# Patient Record
Sex: FEMALE | Race: WHITE | Employment: PART TIME | URBAN - METROPOLITAN AREA
[De-identification: names, ages, dates, MRNs, and addresses within clinical notes are randomized per-mention and may not be internally consistent; named-entity substitution may affect disease eponyms.]

---
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
## 2019-05-28 RX ORDER — NORGESTIMATE AND ETHINYL ESTRADIOL 7DAYSX3 28
1 KIT ORAL DAILY
Qty: 3 PACKAGE | Refills: 4 | Status: CANCELLED | OUTPATIENT
Start: 2019-05-28

## 2019-05-29 ENCOUNTER — OFFICE VISIT (OUTPATIENT)
Dept: OBGYN CLINIC | Age: 19
End: 2019-05-29

## 2019-05-29 VITALS
WEIGHT: 177 LBS | SYSTOLIC BLOOD PRESSURE: 126 MMHG | HEIGHT: 67 IN | DIASTOLIC BLOOD PRESSURE: 64 MMHG | BODY MASS INDEX: 27.78 KG/M2

## 2019-05-29 DIAGNOSIS — Z01.419 ENCOUNTER FOR GYNECOLOGICAL EXAMINATION (GENERAL) (ROUTINE) WITHOUT ABNORMAL FINDINGS: Primary | ICD-10-CM

## 2019-05-29 DIAGNOSIS — Z11.3 SCREEN FOR STD (SEXUALLY TRANSMITTED DISEASE): ICD-10-CM

## 2019-05-29 NOTE — PROGRESS NOTES
[facility]  [email or web address]  [phone]    Angus Purvis MD, FACOG       Annual Gynecologic Exam:  WWE <40  Chief Complaint   Patient presents with    Well Woman         Minus Randell is a 22 y.o.  [place]ITE [place]  female who presents for an annual well woman exam.  Patient's last menstrual period was 05/15/2019 (exact date). .    With regard to the Gardisil vaccine, she received 3 of the 3 injections. She does report additional concerns today. She is no longer taking OCP and wants to try a new form of BC concerned about potential interactions with other meds. Menstrual status:  Her periods are moderate. She does not report dysmenorrhea/painful menses. She does not report irregular bleeding. Sexual history and Contraception:  Social History     Substance and Sexual Activity   Sexual Activity Never     She always use condoms with sexual activity  She does reports new sexual partner(s) in the last year. The patient does not request STD testing. We recommended testing per CDC guidelines and at patient request.     Preventive Medicine History:  Her most recent Pap smear result: Never    Past Medical History:   Diagnosis Date    Acne     Mood disorder ([facility] [address]     bipolar     OB History    Para Term  AB Living   0 0 0 0 0 0   SAB TAB Ectopic Molar Multiple Live Births   0 0 0   0       History reviewed. No pertinent surgical history.   Family History   Problem Relation Age of Onset    Hypertension Maternal Grandfather     Cancer Maternal Grandfather         kidney cancer    Breast Cancer Maternal Grandmother     No Known Problems Mother     No Known Problems Father      Social History     Socioeconomic History    Marital status: SINGLE     Spouse name: Not on file    Number of children: Not on file    Years of education: Not on file    Highest education level: Not on file   Occupational History    Not on file   Social Needs    Financial resource strain: Not on file    Food insecurity:     Worry: Not on file     Inability: Not on file    Transportation needs:     Medical: Not on file     Non-medical: Not on file   Tobacco Use    Smoking status: Never Smoker    Smokeless tobacco: Never Used   Substance and Sexual Activity    Alcohol use: Not on file    Drug use: Not on file    Sexual activity: Never   Lifestyle    Physical activity:     Days per week: Not on file     Minutes per session: Not on file    Stress: Not on file   Relationships    Social connections:     Talks on phone: Not on file     Gets together: Not on file     Attends Nondenominational service: Not on file     Active member of club or organization: Not on file     Attends meetings of clubs or organizations: Not on file     Relationship status: Not on file    Intimate partner violence:     Fear of current or ex partner: Not on file     Emotionally abused: Not on file     Physically abused: Not on file     Forced sexual activity: Not on file   Other Topics Concern    Not on file   Social History Narrative    Not on file       No Known Allergies    Current Outpatient Medications   Medication Sig    TRI-PREVIFEM, 28, 0.18/0.215/0.25 mg-35 mcg (28) tab TAKE 1 TABLET BY MOUTH EVERY DAY    SERTRALINE HCL (ZOLOFT PO) Take  by mouth.  OXCARBAZEPINE (TRILEPTAL PO) Take  by mouth. No current facility-administered medications for this visit. There is no problem list on file for this patient.       Review of Systems - History obtained from the patient  Constitutional: negative for weight loss, fever, night sweats  HEENT: negative for hearing loss, earache, congestion, snoring, sorethroat  CV: negative for chest pain, palpitations, edema  Resp: negative for cough, shortness of breath, wheezing  GI: negative for change in bowel habits, abdominal pain, black or bloody stools  : negative for frequency, dysuria, hematuria  GYN: see HPI  MSK: negative for back pain, joint pain, muscle pain  Breast: negative for breast lumps, nipple discharge, galactorrhea  Skin :negative for itching, rash, hives  Neuro: negative for dizziness, headache, confusion, weakness  Psych: negative for anxiety, depression, change in mood  Heme/lymph: negative for bleeding, bruising, pallor    Physical Exam  Visit Vitals  /64   Ht 5' 7\" (1.702 m)   Wt 177 lb (80.3 kg)   LMP 05/15/2019 (Exact Date)   BMI 27.72 kg/m²       Constitutional  · Appearance: well-nourished, well developed, alert, in no acute distress    HENT  · Head and Face: appears normal    Neck  · Inspection/Palpation: normal appearance, no masses or tenderness  · Lymph Nodes: no lymphadenopathy present  · Thyroid: gland size normal, nontender, no nodules or masses present on palpation    Chest  · Respiratory Effort: breathing unlabored  · Auscultation: normal breath sounds    Cardiovascular  · Heart:  · Auscultation: regular rate and rhythm without murmur    Breasts  · Inspection of Breasts: breasts symmetrical, no skin changes, no discharge present, nipple appearance normal, no skin retraction present  · Palpation of Breasts and Axillae: no masses present on palpation, no breast tenderness  · Axillary Lymph Nodes: no lymphadenopathy present    Gastrointestinal  · Abdominal Examination: abdomen non-tender to palpation, normal bowel sounds, no masses present  · Liver and spleen: no hepatomegaly present, spleen not palpable  · Hernias: no hernias identified    Genitourinary  · External Genitalia: normal appearance for age, no discharge present, no tenderness present, no inflammatory lesions present, no masses present  · Vagina: normal vaginal vault without central or paravaginal defects, no discharge present, no inflammatory lesions present, no masses present  · Bladder: non-tender to palpation  · Urethra: appears normal  · Cervix: normal   · Uterus: normal size, shape and consistency  · Adnexa: no adnexal tenderness present, no adnexal masses present  · Perineum: perineum within normal limits, no evidence of trauma, no rashes or skin lesions present  · Anus: anus within normal limits, no hemorrhoids present  · Inguinal Lymph Nodes: no lymphadenopathy present    Skin  · General Inspection: no rash, no lesions identified    Neurologic/Psychiatric  · Mental Status:  · Orientation: grossly oriented to person, place and time  · Mood and Affect: mood normal, affect appropriate    Assessment:  22 y.o.  for well woman exam  Encounter Diagnoses   Name Primary?  Screen for STD (sexually transmitted disease)     Encounter for gynecological examination (general) (routine) without abnormal findings Yes       Plan:  The patient was counseled about diet, exercise, healthy lifestyle  We discussed self breast exam  We discussed safer sex practices, condom use and risk factors for sexually transmitted diseases. We discussed current pap smear and HR HPV testing guidelines. We recommend follow up one year for routine annual gynecologic exam or sooner prn  We recommend routine follow up with her primary care doctor for management of chronic medical problems and non-gynecologic concerns  Handouts were given to the patient  We discussed calcium/vitamin D/weight bearing exercise and osteoporosis prevention  We discussed progesterone only and non hormonal options for contraception including but not limited to condoms, IUDs, Nexplanon, and depo provera. Discussed risks, benefits and alternatives of OCP/nuvaring/patch: including but not limited to dvt/pe/mi/cva/ca/gi risks and that smoking, increasing age and other health conditions can increase these risks. nexplanon h/o  RTC for IUD or LARC placement on menses: after patient confirms coverage with insurance and has no unprotected intercourse for two weeks. Patient advised to premedicate with 600 mg ibuprofen if she has no contraindications.  Her AE and pap should also be up to date, if indicated. Folllow up:  [x] return for annual well woman exam in one year or sooner if she is having problems  [] follow up and ultrasound  [] 6 months  [] 3 months  [] 6 weeks   [] 1 month    Orders Placed This Encounter    CHLAMYDIA/GC PCR       No results found for any visits on 05/29/19.

## 2019-05-29 NOTE — PATIENT INSTRUCTIONS

## 2019-05-30 LAB
C TRACH RRNA SPEC QL NAA+PROBE: NEGATIVE
N GONORRHOEA RRNA SPEC QL NAA+PROBE: NEGATIVE

## 2019-06-04 ENCOUNTER — OFFICE VISIT (OUTPATIENT)
Dept: OBGYN CLINIC | Age: 19
End: 2019-06-04

## 2019-06-04 VITALS
WEIGHT: 175 LBS | SYSTOLIC BLOOD PRESSURE: 124 MMHG | DIASTOLIC BLOOD PRESSURE: 72 MMHG | HEIGHT: 67 IN | BODY MASS INDEX: 27.47 KG/M2

## 2019-06-04 DIAGNOSIS — Z76.89 ENCOUNTER FOR MENSTRUAL REGULATION: Primary | ICD-10-CM

## 2019-06-04 LAB
HCG URINE, QL. (POC): NEGATIVE
VALID INTERNAL CONTROL?: YES

## 2019-06-04 NOTE — PATIENT INSTRUCTIONS
Implant for Birth Control: Care Instructions  Your Care Instructions    The implant is used to prevent pregnancy. It's a thin goldie about the size of a matchstick that is inserted under the skin (subdermal) on the inside of your arm. The implant prevents pregnancy for 3 years. After it is put in, you don't have to do anything else to prevent pregnancy. Follow-up care is a key part of your treatment and safety. Be sure to make and go to all appointments, and call your doctor if you are having problems. It's also a good idea to know your test results and keep a list of the medicines you take. How can you care for yourself at home? How do you use the subdermal implant? · The implant is put in and taken out by your doctor or another trained health professional. This is done in your doctor's office. It only takes a few minutes. · Ask your doctor if you need to use backup birth control, such as a condom. And ask if (and how long) you should avoid intercourse after you get the implant. You may need to do this, depending on where you are in your cycle. What else do you need to know? · The implant has side effects. ? You may have changes in your period. Your period may stop. You may also have spotting or bleeding between periods. ? You may have mood changes, less interest in sex, or weight gain. · Remember that 3 years after you receive the implant, you must have it removed or get a new one.  ? If you don't replace the implant and don't use another form of birth control, you could get pregnant. ? If you have the implant removed, you'll have to find another method of birth control. If you don't, you may get pregnant. ? Even if you are planning to get pregnant, you have to have the implant removed. · Check with your doctor before you use any other medicines. This includes over-the-counter medicines, vitamins, herbal products, and supplements.  Birth control hormones may not work as well to prevent pregnancy when combined with other medicines. · The implant doesn't protect against sexually transmitted infections (STIs), such as herpes or HIV/AIDS. If you're not sure if your sex partner might have an STI, use a condom to protect against infection. When should you call for help? Watch closely for changes in your health, and be sure to contact your doctor if you have any problems. Where can you learn more? Go to http://matilde-qamar.info/. Enter X457 in the search box to learn more about \"Implant for Birth Control: Care Instructions. \"  Current as of: September 5, 2018  Content Version: 11.9  © 5219-7397 [employer]. Care instructions adapted under license by [employer] (which disclaims liability or warranty for this information). If you have questions about a medical condition or this instruction, always ask your healthcare professional. [address] any warranty or liability for your use of this information.

## 2019-06-04 NOTE — PROGRESS NOTES
[facility]  OFFICE PROCEDURE PROGRESS NOTE        Chart reviewed for the following:   Roseann HUTCHISON, have reviewed the History, Physical and updated the Allergic reactions for [place] performed immediately prior to start of procedure:   Roseann HUTCHISON, have performed the following reviews on [address] [place] [address] prior to the start of the procedure:            * Patient was identified by name and date of birth   * Agreement on procedure being performed was verified  * Risks and Benefits explained to the patient  * Procedure site verified and marked as necessary  * Consent was signed and verified     Time: 1:20 pm    Date of procedure: 6/4/2019    Procedure performed by: Shandra Springer MD    Provider assisted by: Roseann Watts LPN    Patient assisted by: self    How tolerated by patient: tolerated the procedure well with no complications    Post Procedural Pain Scale: 0 - No Hurt  Comments: none            Procedure note: Nexplanon insertion    [address] [place] [address] is a [de-identified] ,  22 y.o. female [facility] whose Patient's last menstrual period was 05/31/2019. was on 5/31/2019 presents for office insertion of an [address] [place] [address] [address] sub-dermal contraceptive implant. She has had an opportunity to read the [address] [place] [address] [address] \"Patient Labeling and Consent Form\". We counseled the patient about the insertion and removal procedures as well as potential side-effects, benefits and risks. She state she had no further questions and signed the consent form. She confirmed that she has no allergies to Betadine or Xylocaine. She reclined on the examination table in the supine position with her left arm flexed at the elbow and externally rotated. The insertion site was identified and marked approximately 6-8 cm proximal to the elbow crease at the inner side of the upper arm in the standard fashion. A second marsha was placed 6-8 cm above the first marsha.  The insertion site was cleansed with Betadine antiseptic. Approximately 3 ml of 1% plain xylocaine were injected just under the skin along the planned insertion canal. The NEXPLANON sterile applicator was carefully removed from its blister pack and kept sterile. I removed the needle cap. I visually verified the presence of NEXPLANON inside the needle tip. The skin at the insertion site was then stretched by my thumb and index finger. I then inserted the needle tip through the skin at the appropriate angle to the skin surface, just until the skin has been penetrated. The needle was gently inserted to its full length. The slider was then pushed all the way and then released. I then removed the needle and palpated the implant in the appropriate location. The patient also palpated the implant in place. Both the patient and I were able to confirm the presence of the Miriam Belts in its subdermal location by palpation. A small adhesive bandage over the insertion site then wrapped a pressure bandage with sterile gauze. The patient User Card and Patient Chart Label were filled in. She was given the User Card for her records after explaining it to her in detail. I stressed to her that she must have the Miriam Belts removed before three years from today's date. She was then given the Personal Calendar (Bleeding Diary) with instructions in it's use. The patient received Nexplanon lot number [id number]. The Nexplanon did not come from a speciality pharmacy. We discussed typical bleeding patterns and side effects with the Nexplanon  We recommend bleeding/symptom calendar and follow three months to evaluate. We recommended back up contraception x 2 weeks after insertion.

## 2019-08-02 ENCOUNTER — TELEPHONE (OUTPATIENT)
Dept: OBGYN CLINIC | Age: 19
End: 2019-08-02

## 2019-08-02 NOTE — TELEPHONE ENCOUNTER
Patient's mother Jessica Mak calling (hippa & pw verified) stating that the patient has been battling a yeast infection x 1.5 weeks and tried OTC monistat with no relief. She has vaginal discharge with itching and irritaiton. Spoke with TP - can be seen today if at the office by 2:45pm or can be seen at 8:50am on Monday. 2:36pm - L/M for patient's mother and advised of above. Requested mother to call back with which appt she would like.

## 2019-08-06 ENCOUNTER — OFFICE VISIT (OUTPATIENT)
Dept: OBGYN CLINIC | Age: 19
End: 2019-08-06

## 2019-08-06 VITALS
DIASTOLIC BLOOD PRESSURE: 60 MMHG | WEIGHT: 170 LBS | BODY MASS INDEX: 26.68 KG/M2 | HEIGHT: 67 IN | SYSTOLIC BLOOD PRESSURE: 126 MMHG

## 2019-08-06 DIAGNOSIS — N89.8 VAGINAL DISCHARGE: ICD-10-CM

## 2019-08-06 DIAGNOSIS — N89.8 VAGINAL ODOR: Primary | ICD-10-CM

## 2019-08-06 LAB — WET MOUNT POCT, WMPOCT: NORMAL

## 2019-08-06 RX ORDER — NYSTATIN AND TRIAMCINOLONE ACETONIDE 100000; 1 [USP'U]/G; MG/G
OINTMENT TOPICAL 2 TIMES DAILY
Qty: 30 G | Refills: 0 | Status: SHIPPED | OUTPATIENT
Start: 2019-08-06 | End: 2019-08-13

## 2019-08-06 RX ORDER — FLUCONAZOLE 150 MG/1
150 TABLET ORAL DAILY
Qty: 1 TAB | Refills: 0 | Status: SHIPPED | OUTPATIENT
Start: 2019-08-06 | End: 2021-03-03 | Stop reason: ALTCHOICE

## 2019-08-06 NOTE — PROGRESS NOTES
[facility]  [email or web address]  [phone]    Isidoro Rankin MD, FACOG       OB/GYN Problem visit    Chief Complaint:   Chief Complaint   Patient presents with    Vaginitis     recurrent yeast       History of Present Illness: This is a new problem being evaluated by this provider. The patient is a 22 y.o. [de-identified]  female who reports having external itching for 2 weeks. She reports the symptoms are is unchanged. Aggravating factors include wearing a costume at work (quit now). Alleviating factors include none. Tried OTC yeast med 1 wk ago      She does not have other concerns. LMP: Patient's last menstrual period was 07/15/2019. PFSH:  Past Medical History:   Diagnosis Date    Acne     Mood disorder ([address]     bipolar    Nexplanon insertion 06/04/2019     History reviewed. No pertinent surgical history. Family History   Problem Relation Age of Onset    Hypertension Maternal Grandfather     Cancer Maternal Grandfather         kidney cancer    Breast Cancer Maternal Grandmother     No Known Problems Mother     No Known Problems Father      Social History     Tobacco Use    Smoking status: Never Smoker    Smokeless tobacco: Never Used   Substance Use Topics    Alcohol use: Not on file    Drug use: Not on file     No Known Allergies  Current Outpatient Medications   Medication Sig    nystatin-triamcinolone (MYCOLOG) 100,000-0.1 unit/gram-% ointment Apply  to affected area two (2) times a day for 7 days.  fluconazole (DIFLUCAN) 150 mg tablet Take 1 Tab by mouth daily.  SERTRALINE HCL (ZOLOFT PO) Take  by mouth.  OXCARBAZEPINE (TRILEPTAL PO) Take  by mouth.  TRI-PREVIFEM, 28, 0.18/0.215/0.25 mg-35 mcg (28) tab TAKE 1 TABLET BY MOUTH EVERY DAY     No current facility-administered medications for this visit.         Review of Systems:  History obtained from the patient  Constitutional: negative for fevers, chills and weight loss  ENT ROS: negative for - hearing change, oral lesions or visual changes  Respiratory: negative for cough, wheezing or dyspnea on exertion  Cardiovascular: negative for chest pain, irregular heart beats, exertional chest pressure/discomfort  Gastrointestinal: negative for dysphagia, nausea and vomiting  Genito-Urinary ROS:  see HPI  Inteument/breast: negative for rash, breast lump and nipple discharge  Musculoskeletal:negative for stiff joints, neck pain and muscle weakness  Endocrine ROS: negative for - breast changes, galactorrhea or temperature intolerance  Hematological and Lymphatic ROS: negative for - blood clots, bruising or swollen lymph nodes    Physical Exam:  Visit Vitals  /60   Ht 5' 7\" (1.702 m)   Wt 170 lb (77.1 kg)   BMI 26.63 kg/m²       GENERAL: alert, well appearing, and in no distress  HEAD: normocephalic, atraumatic. PULM: clear to auscultation, no wheezes, rales or rhonchi, symmetric air entry   COR: normal rate and regular rhythm, S1 and S2 normal   ABDOMEN: soft, nontender, nondistended, no masses or organomegaly   EGBUS: no lesions, mild labial minora inflammation, no masses  VULVA: normal appearing vulva with no masses, tenderness or lesions  VAGINA: normal appearing vagina with normal color, no lesions, white and thin discharge  CERVIX: normal appearing cervix without discharge or lesions, non tender  UTERUS: uterus is normal size, shape, consistency and nontender   ADNEXA: normal adnexa in size, nontender and no masses  NEURO: alert, oriented, normal speech    Assessment:  Encounter Diagnoses   Name Primary?  Vaginal odor Yes    Vaginal discharge        Plan:  The patient is advised that she should contact the office if she does not note improvement or if symptoms recur  Recommend follow up with PCP for non-gynecologic complaints and chronic medical problems. She should contact our office with any questions or concerns  She could keep her routine annual exam appointment.    We discussed potential causes of vaginal discharge/irritation. We discussed good vulvar hygiene. Recommended avoid vaginal irritants. Discussed use of mild soaps/detergents. Follow up if NI. We reviewed wet prep findings with the patient at her visit  Patient will be notified about swab results and prescription sent, if indicated.    Diflucan/mycolog II      Orders Placed This Encounter    NUSWAB VAGINITIS PLUS    AMB POC WET PREP (AKA STAIN, INTERPRET, WET MOUNT)    nystatin-triamcinolone (MYCOLOG) 100,000-0.1 unit/gram-% ointment    fluconazole (DIFLUCAN) 150 mg tablet       Results for orders placed or performed in visit on 08/06/19   AMB POC SMEAR, STAIN & INTERPRET, WET MOUNT   Result Value Ref Range    Wet mount (POC)      Narrative    MARIA ESTHER    Hypae: negative  Buds: negative    Wet Prep:  Trich: negative  Clue cells: negative  Hyphae: negative  Buds: negative  WBC's: normal

## 2019-08-06 NOTE — PATIENT INSTRUCTIONS
Vaginitis: Care Instructions  Your Care Instructions    Vaginitis is soreness or infection of the vagina. This common problem can cause itching and burning. And it can cause a change in vaginal discharge. Sometimes it can cause pain during sex. Vaginitis may be caused by bacteria, yeast, or other germs. Some infections that cause it are caught from a sexual partner. Bath products, spermicides, and douches can irritate the vagina too. Some women have this problem during and after menopause. A drop in estrogen levels during this time can cause dryness, soreness, and pain during sex. Your doctor can give you medicine to treat an infection. And home care may help you feel better. For certain types of infections, your sex partner must be treated too. Follow-up care is a key part of your treatment and safety. Be sure to make and go to all appointments, and call your doctor if you are having problems. It's also a good idea to know your test results and keep a list of the medicines you take. How can you care for yourself at home? · If your doctor prescribed antibiotics, take them as directed. Do not stop taking them just because you feel better. You need to take the full course of antibiotics. · Take your medicines exactly as prescribed. Call your doctor if you think you are having a problem with your medicine. · Do not eat or drink anything that has alcohol if you are taking metronidazole (Flagyl). · If you have a yeast infection, use over-the-counter products as your doctor tells you to. Or take medicine your doctor prescribes exactly as directed. · Wash your vaginal area daily with water. You also can use a mild, unscented soap if you want. · Do not use scented bath products. And do not use vaginal sprays or douches. · Put a washcloth soaked in cool water on the area to relieve itching. Or you can take cool baths.   · If you have dryness because of menopause, use estrogen cream or pills that your doctor prescribes. · Ask your doctor about when it is okay to have sex. · Use a personal lubricant before sex if you have dryness. Examples are Astroglide, K-Y Jelly, and Wet Lubricant Gel. · Ask your doctor if your sex partner also needs treatment. When should you call for help? Call your doctor now or seek immediate medical care if:    · You have a fever and pelvic pain.    Watch closely for changes in your health, and be sure to contact your doctor if:    · You have bleeding other than your period.     · You do not get better as expected. Where can you learn more? Go to http://matilde-qamar.info/. Enter H352 in the search box to learn more about \"Vaginitis: Care Instructions. \"  Current as of: February 19, 2019  Content Version: 12.1  © 4807-3226 [employer]. Care instructions adapted under license by [employer] (which disclaims liability or warranty for this information). If you have questions about a medical condition or this instruction, always ask your healthcare professional. [address] any warranty or liability for your use of this information.

## 2019-08-09 LAB
A VAGINAE DNA VAG QL NAA+PROBE: ABNORMAL SCORE
BVAB2 DNA VAG QL NAA+PROBE: ABNORMAL SCORE
C ALBICANS DNA VAG QL NAA+PROBE: NEGATIVE
C GLABRATA DNA VAG QL NAA+PROBE: NEGATIVE
C TRACH RRNA SPEC QL NAA+PROBE: NEGATIVE
MEGA1 DNA VAG QL NAA+PROBE: ABNORMAL SCORE
N GONORRHOEA RRNA SPEC QL NAA+PROBE: NEGATIVE
T VAGINALIS RRNA SPEC QL NAA+PROBE: NEGATIVE

## 2019-08-11 NOTE — PROGRESS NOTES
Abnormal, notify patient.   Consistent with BV (not yeast)  Rx:   flagyl 500mg bid   x 7 days    May cause nausea, take with food  Avoid etoh during treatment and 1 day following  Notify MD if NI after 1 week    (If patient prefers vaginal tx't  0.75 %t metronidazole vaginal gel   5 grams qhs per vagina  x5 days)

## 2019-08-19 ENCOUNTER — TELEPHONE (OUTPATIENT)
Dept: OBGYN CLINIC | Age: 19
End: 2019-08-19

## 2019-08-19 RX ORDER — METRONIDAZOLE 500 MG/1
500 TABLET ORAL 2 TIMES DAILY
Qty: 14 TAB | Refills: 0 | Status: SHIPPED | OUTPATIENT
Start: 2019-08-19 | End: 2019-08-26

## 2019-08-19 NOTE — TELEPHONE ENCOUNTER
Patient of TP. Calling to get results from 8-6-19:      Notes recorded by Jose Jansen MD on 8/11/2019 at 3:43 PM EDT  Abnormal, notify patient.   Consistent with BV (not yeast)  Rx:   flagyl 500mg bid   x 7 days    May cause nausea, take with food  Avoid etoh during treatment and 1 day following  Notify MD if NI after 1 week    (If patient prefers vaginal tx't  0.75 %t metronidazole vaginal gel   5 grams qhs per vagina  x5 days)      [facility]

## 2019-09-27 ENCOUNTER — TELEPHONE (OUTPATIENT)
Dept: OBGYN CLINIC | Age: 19
End: 2019-09-27

## 2019-09-27 RX ORDER — METRONIDAZOLE 7.5 MG/G
1 GEL VAGINAL
Qty: 45 G | Refills: 0 | Status: SHIPPED | OUTPATIENT
Start: 2019-09-27 | End: 2019-10-02

## 2019-09-27 NOTE — TELEPHONE ENCOUNTER
Patient of TP. She was recently diagnosed with BV on 8/6/19. She took the Flagyl 500 mg bid x 7 days and she never fully got better except she saw improvement x 2 days. She said now it is back to bothering her to the point she has to wear pads on and off. Itchy yellow discharge with foul fishy odor. She would like to try the Metrogel that you advised. She is off at college but still wants to try it.        [address] [place] [address], [address] [place] [address]

## 2021-02-03 ENCOUNTER — OFFICE VISIT (OUTPATIENT)
Dept: OBGYN CLINIC | Age: 21
End: 2021-02-03
Payer: COMMERCIAL

## 2021-02-03 VITALS
SYSTOLIC BLOOD PRESSURE: 116 MMHG | HEIGHT: 67 IN | WEIGHT: 159.6 LBS | HEART RATE: 94 BPM | BODY MASS INDEX: 25.05 KG/M2 | DIASTOLIC BLOOD PRESSURE: 80 MMHG

## 2021-02-03 DIAGNOSIS — N89.8 VAGINAL DISCHARGE: Primary | ICD-10-CM

## 2021-02-03 DIAGNOSIS — N63.20 LEFT BREAST LUMP: ICD-10-CM

## 2021-02-03 PROCEDURE — 99213 OFFICE O/P EST LOW 20 MIN: CPT | Performed by: OBSTETRICS & GYNECOLOGY

## 2021-02-03 RX ORDER — ETONOGESTREL 68 MG/1
IMPLANT SUBCUTANEOUS
COMMUNITY
End: 2021-04-21

## 2021-02-03 RX ORDER — LAMOTRIGINE 200 MG/1
TABLET ORAL
COMMUNITY
Start: 2021-01-04

## 2021-02-03 RX ORDER — TRAZODONE HYDROCHLORIDE 50 MG/1
TABLET ORAL
COMMUNITY
Start: 2020-11-19

## 2021-02-03 RX ORDER — SPIRONOLACTONE 100 MG/1
TABLET, FILM COATED ORAL
COMMUNITY
Start: 2020-12-24

## 2021-02-03 NOTE — PATIENT INSTRUCTIONS
Vaginitis: Care Instructions Your Care Instructions Vaginitis is soreness or infection of the vagina. This common problem can cause itching and burning. And it can cause a change in vaginal discharge. Sometimes it can cause pain during sex. Vaginitis may be caused by bacteria, yeast, or other germs. Some infections that cause it are caught from a sexual partner. Bath products, spermicides, and douches can irritate the vagina too. Some women have this problem during and after menopause. A drop in estrogen levels during this time can cause dryness, soreness, and pain during sex. Your doctor can give you medicine to treat an infection. And home care may help you feel better. For certain types of infections, your sex partner must be treated too. Follow-up care is a key part of your treatment and safety. Be sure to make and go to all appointments, and call your doctor if you are having problems. It's also a good idea to know your test results and keep a list of the medicines you take. How can you care for yourself at home? · If your doctor prescribed antibiotics, take them as directed. Do not stop taking them just because you feel better. You need to take the full course of antibiotics. · Take your medicines exactly as prescribed. Call your doctor if you think you are having a problem with your medicine. · Do not eat or drink anything that has alcohol if you are taking metronidazole (Flagyl). · If you have a yeast infection, use over-the-counter products as your doctor tells you to. Or take medicine your doctor prescribes exactly as directed. · Wash your vaginal area daily with water. You also can use a mild, unscented soap if you want. · Do not use scented bath products. And do not use vaginal sprays or douches. · Put a washcloth soaked in cool water on the area to relieve itching. Or you can take cool baths. · If you have dryness because of menopause, use estrogen cream or pills that your doctor prescribes. · Ask your doctor about when it is okay to have sex. · Use a personal lubricant before sex if you have dryness. Examples are Astroglide, K-Y Jelly, and Wet Lubricant Gel. · Ask your doctor if your sex partner also needs treatment. When should you call for help? Call your doctor now or seek immediate medical care if: 
  · You have a fever and pelvic pain. Watch closely for changes in your health, and be sure to contact your doctor if: 
  · You have bleeding other than your period.  
  · You do not get better as expected. Where can you learn more? Go to http://www.gray.com/ Enter I796 in the search box to learn more about \"Vaginitis: Care Instructions. \" Current as of: November 8, 2019               Content Version: 12.6 © 9742-4728 [employer]. Care instructions adapted under license by [employer] (which disclaims liability or warranty for this information). If you have questions about a medical condition or this instruction, always ask your healthcare professional. [address] any warranty or liability for your use of this information.

## 2021-02-03 NOTE — PROGRESS NOTES
[facility]  [email or web address]  [phone]    Joie Finney MD, FACOG       OB/GYN Problem visit    Chief Complaint:   Chief Complaint   Patient presents with    Breast Mass       Last or next [address] is: 5/29/2019    History of Present Illness: This is a new problem being evaluated by this provider. The patient is a 23 y.o. [de-identified]  female who reports having felt a breast lump in her left breast for 2 days. Pt reports the location of the lump at about 6 o'clock  She reports the symptoms are is unchanged. Aggravating factors include pressing on it. Alleviating factors include none. She does have other concerns. Pt reports getting BV about 5 times per years. Most recent BV was about 2 months, pt reports using the gel Dr. Ran Paul prescribes and that works, however, the [address] comes back about 2 months later. Pt reports an itching in her vaginal area and an odor. Treated 2-3x/6mos at [address]; gel better than oral flagyl. LMP: Patient's last menstrual period was 01/05/2021 (within days). PFSH:  Past Medical History:   Diagnosis Date    Acne     Mood disorder ([facility] [address]     bipolar    Nexplanon insertion 06/04/2019     No past surgical history on file.   Family History   Problem Relation Age of Onset    Hypertension Maternal Grandfather     Cancer Maternal Grandfather         kidney cancer    Breast Cancer Maternal Grandmother     No Known Problems Mother     No Known Problems Father      Social History     Tobacco Use    Smoking status: Never Smoker    Smokeless tobacco: Never Used   Substance Use Topics    Alcohol use: Not on file    Drug use: Not on file     No Known Allergies  Current Outpatient Medications   Medication Sig    lamoTRIgine (LaMICtal) 200 mg tablet TAKE 1 TABLET BY MOUTH EVERYDAY AT BEDTIME    spironolactone (ALDACTONE) 100 mg tablet TAKE 1 TABLET BY MOUTH EVERY DAY AT NIGHT    traZODone (DESYREL) 50 mg tablet TAKE 1 2 ORAL TABLETS AS NEEDED FOR SLEEP    etonogestreL (Nexplanon) 68 mg impl by SubCUTAneous route.  SERTRALINE HCL (ZOLOFT PO) Take  by mouth.  fluconazole (DIFLUCAN) 150 mg tablet Take 1 Tab by mouth daily.  TRI-PREVIFEM, 28, 0.18/0.215/0.25 mg-35 mcg (28) tab TAKE 1 TABLET BY MOUTH EVERY DAY    OXCARBAZEPINE (TRILEPTAL PO) Take  by mouth. No current facility-administered medications for this visit. Review of Systems:  History obtained from the patient  Constitutional: negative for fevers, chills and weight loss  ENT ROS: negative for - hearing change, oral lesions or visual changes  Respiratory: negative for cough, wheezing or dyspnea on exertion  Cardiovascular: negative for chest pain, irregular heart beats, exertional chest pressure/discomfort  Gastrointestinal: negative for dysphagia, nausea and vomiting  Genito-Urinary ROS:  see HPI  Inteument/breast: negative for rash, breast lump and nipple discharge  Musculoskeletal:negative for stiff joints, neck pain and muscle weakness  Endocrine ROS: negative for - breast changes, galactorrhea or temperature intolerance  Hematological and Lymphatic ROS: negative for - blood clots, bruising or swollen lymph nodes    Physical Exam:  Visit Vitals  /80 (BP 1 Location: Right upper arm, BP Patient Position: Sitting)   Pulse 94   Ht 5' 7\" (1.702 m)   Wt 159 lb 9.6 oz (72.4 kg)   BMI 25.00 kg/m²       GENERAL: alert, well appearing, and in no distress  HEAD: normocephalic, atraumatic.    PULM: clear to auscultation, no wheezes, rales or rhonchi, symmetric air entry   COR: normal rate and regular rhythm, S1 and S2 normal   ABDOMEN: soft, nontender, nondistended, no masses or organomegaly   EGBUS: no lesions, no inflammation, no masses  VULVA: normal appearing vulva with no masses, tenderness or lesions  VAGINA: normal appearing vagina with normal color, no lesions, white and thin discharge  CERVIX: normal appearing cervix without discharge or lesions, non tender  UTERUS: uterus is normal size, shape, consistency and nontender   ADNEXA: normal adnexa in size, nontender and no masses  NEURO: alert, oriented, normal speech  Breast exam: breasts appear normal, no suspicious masses, no skin or nipple changes or axillary nodes, left breast increased small mobile sub centimeter cysts inferior midline. Assessment:  Encounter Diagnoses   Name Primary?  Vaginal discharge Yes    Left breast lump        Plan:  The patient is advised that she should contact the office if she does not note improvement or if symptoms recur  Recommend follow up with PCP for non-gynecologic complaints and chronic medical problems. She should contact our office with any questions or concerns  She could keep her routine annual exam appointment. Disc possible solosec if +bv, vs suppression (need records) metrogel     Orders Placed This Encounter    US BREAST LT COMPLETE 4 QUAD    NUSWAB VAGINITIS PLUS ([facility])       No results found for this visit on 02/03/21.

## 2021-02-06 LAB
A VAGINAE DNA VAG QL NAA+PROBE: ABNORMAL SCORE
BVAB2 DNA VAG QL NAA+PROBE: ABNORMAL SCORE
C ALBICANS DNA VAG QL NAA+PROBE: NEGATIVE
C GLABRATA DNA VAG QL NAA+PROBE: NEGATIVE
C TRACH DNA VAG QL NAA+PROBE: NEGATIVE
MEGA1 DNA VAG QL NAA+PROBE: ABNORMAL SCORE
N GONORRHOEA DNA VAG QL NAA+PROBE: NEGATIVE
T VAGINALIS DNA VAG QL NAA+PROBE: NEGATIVE

## 2021-02-07 RX ORDER — SECNIDAZOLE 2 G/4.8G
1 GRANULE ORAL ONCE
Qty: 1 PACKET | Refills: 0 | Status: SHIPPED | OUTPATIENT
Start: 2021-02-07 | End: 2021-02-07

## 2021-02-07 NOTE — PROGRESS NOTES
Abnormal,Consistent with BV   Need outsider records to consider suppression  Pt wanted to try solosec this time but if too expensive can send flagyl rx below  solosec sent. Notify pt if [employer] message not read or not active.   Rx:   flagyl 500mg bid   x 7 days    May cause nausea, take with food  Avoid etoh during treatment and 1 day following  Notify MD if NI after 1 week    (If patient prefers vaginal tx't  0.75 %t metronidazole vaginal gel   5 grams qhs per vagina  x5 days)

## 2021-02-10 ENCOUNTER — HOSPITAL ENCOUNTER (OUTPATIENT)
Dept: MAMMOGRAPHY | Age: 21
Discharge: HOME | End: 2021-02-10
Attending: OBSTETRICS & GYNECOLOGY
Payer: COMMERCIAL

## 2021-02-10 DIAGNOSIS — N63.20 LEFT BREAST LUMP: ICD-10-CM

## 2021-02-10 PROCEDURE — 76642 ULTRASOUND BREAST LIMITED: CPT

## 2021-02-10 NOTE — PROGRESS NOTES
Breast normal.  Please update chart per protocol. Pt notified by rad per note. [address] message sent if active.   Update PMH and HM: include:  BIRADS level (0-6) 2, dense fibroglandular tissue  date of imaging (mm/dd/yy)  add \"dense breast tissue\" if in comments  add Radiologist impression, if indicated: comments/measurements from radiologist related to lymph nodes/cysts/mass  If not bilateral: record side of imaging

## 2021-02-11 NOTE — PROGRESS NOTES
Called and spoke to patient. Discussed nuswab results and MDs recommendations. Patient states she will call us if the Solosec is too expensive.

## 2021-03-03 ENCOUNTER — OFFICE VISIT (OUTPATIENT)
Dept: OBGYN CLINIC | Age: 21
End: 2021-03-03
Payer: COMMERCIAL

## 2021-03-03 VITALS
WEIGHT: 155 LBS | HEIGHT: 67 IN | BODY MASS INDEX: 24.33 KG/M2 | DIASTOLIC BLOOD PRESSURE: 72 MMHG | SYSTOLIC BLOOD PRESSURE: 118 MMHG

## 2021-03-03 DIAGNOSIS — Z97.5 NEXPLANON IN PLACE: ICD-10-CM

## 2021-03-03 DIAGNOSIS — Z12.4 ENCOUNTER FOR PAPANICOLAOU SMEAR FOR CERVICAL CANCER SCREENING: Primary | ICD-10-CM

## 2021-03-03 DIAGNOSIS — N93.9 ABNORMAL UTERINE BLEEDING (AUB): ICD-10-CM

## 2021-03-03 DIAGNOSIS — Z11.3 VENEREAL DISEASE SCREENING: ICD-10-CM

## 2021-03-03 DIAGNOSIS — Z01.419 ENCOUNTER FOR GYNECOLOGICAL EXAMINATION (GENERAL) (ROUTINE) WITHOUT ABNORMAL FINDINGS: ICD-10-CM

## 2021-03-03 PROCEDURE — 99395 PREV VISIT EST AGE 18-39: CPT | Performed by: OBSTETRICS & GYNECOLOGY

## 2021-03-03 RX ORDER — SERTRALINE HYDROCHLORIDE 50 MG/1
TABLET, FILM COATED ORAL
COMMUNITY
Start: 2021-02-06

## 2021-03-03 RX ORDER — SECNIDAZOLE 2 G/4.8G
GRANULE ORAL
COMMUNITY
Start: 2021-02-10 | End: 2021-04-21

## 2021-03-03 NOTE — PATIENT INSTRUCTIONS
Well Visit, Ages 25 to 48: Care Instructions Your Care Instructions Physical exams can help you stay healthy. Your doctor has checked your overall health and may have suggested ways to take good care of yourself. He or she also may have recommended tests. At home, you can help prevent illness with healthy eating, regular exercise, and other steps. Follow-up care is a key part of your treatment and safety. Be sure to make and go to all appointments, and call your doctor if you are having problems. It's also a good idea to know your test results and keep a list of the medicines you take. How can you care for yourself at home? · Reach and stay at a healthy weight. This will lower your risk for many problems, such as obesity, diabetes, heart disease, and high blood pressure. · Get at least 30 minutes of physical activity on most days of the week. Walking is a good choice. You also may want to do other activities, such as running, swimming, cycling, or playing tennis or team sports. Discuss any changes in your exercise program with your doctor. · Do not smoke or allow others to smoke around you. If you need help quitting, talk to your doctor about stop-smoking programs and medicines. These can increase your chances of quitting for good. · Talk to your doctor about whether you have any risk factors for sexually transmitted infections (STIs). Having one sex partner (who does not have STIs and does not have sex with anyone else) is a good way to avoid these infections. · Use birth control if you do not want to have children at this time. Talk with your doctor about the choices available and what might be best for you. · Protect your skin from too much sun. When you're outdoors from 10 a.m. to 4 p.m., stay in the shade or cover up with clothing and a hat with a wide brim. Wear sunglasses that block UV rays. Even when it's cloudy, put broad-spectrum sunscreen (SPF 30 or higher) on any exposed skin. · See a dentist one or two times a year for checkups and to have your teeth cleaned. · Wear a seat belt in the car. Follow your doctor's advice about when to have certain tests. These tests can spot problems early. For everyone · Cholesterol. Have the fat (cholesterol) in your blood tested after age 21. Your doctor will tell you how often to have this done based on your age, family history, or other things that can increase your risk for heart disease. · Blood pressure. Have your blood pressure checked during a routine doctor visit. Your doctor will tell you how often to check your blood pressure based on your age, your blood pressure results, and other factors. · Vision. Talk with your doctor about how often to have a glaucoma test. 
· Diabetes. Ask your doctor whether you should have tests for diabetes. · Colon cancer. Your risk for colorectal cancer gets higher as you get older. Some experts say that adults should start regular screening at age 48 and stop at age 76. Others say to start before age 48 or continue after age 76. Talk with your doctor about your risk and when to start and stop screening. For women · Breast exam and mammogram. Talk to your doctor about when you should have a clinical breast exam and a mammogram. Medical experts differ on whether and how often women under 50 should have these tests. Your doctor can help you decide what is right for you. · Cervical cancer screening test and pelvic exam. Begin with a Pap test at age 24. The test often is part of a pelvic exam. Starting at age 27, you may choose to have a Pap test, an HPV test, or both tests at the same time (called co-testing). Talk with your doctor about how often to have testing. · Tests for sexually transmitted infections (STIs). Ask whether you should have tests for STIs. You may be at risk if you have sex with more than one person, especially if your partners do not wear condoms. For men · Tests for sexually transmitted infections (STIs). Ask whether you should have tests for STIs. You may be at risk if you have sex with more than one person, especially if you do not wear a condom. · Testicular cancer exam. Ask your doctor whether you should check your testicles regularly. · Prostate exam. Talk to your doctor about whether you should have a blood test (called a PSA test) for prostate cancer. Experts differ on whether and when men should have this test. Some experts suggest it if you are older than 39 and are -American or have a father or brother who got prostate cancer when he was younger than 72. When should you call for help? Watch closely for changes in your health, and be sure to contact your doctor if you have any problems or symptoms that concern you. Where can you learn more? Go to http://www.ash.com/ Enter P072 in the search box to learn more about \"Well Visit, Ages 25 to 48: Care Instructions. \" Current as of: May 27, 2020               Content Version: 12.6 © 2006-2020 [employer]. Care instructions adapted under license by [employer] (which disclaims liability or warranty for this information). If you have questions about a medical condition or this instruction, always ask your healthcare professional. [address] any warranty or liability for your use of this information.

## 2021-03-03 NOTE — PROGRESS NOTES
[address] [facility]  [email or web address]  [phone]    Julieta Murcia MD, FACOG       Annual Gynecologic Exam:  WWE <40  Chief Complaint   Patient presents with    Well Woman         Onelia Contreras is a 23 y.o.  WHITE female who presents for an annual well woman exam.  Patient's last menstrual period was 2021 (within days). .    She reports the following additional concerns: Pt reports that she has been on her cycle since ~ . Cycle has been heavy with cramping.      + new partners. female      Menstrual status:  She does report dysmenorrhea/painful menses. She does report heavy menses. She does report irregular bleeding. Sexual history and Contraception:  Social History     Substance and Sexual Activity   Sexual Activity Yes    Partners: Female    Birth control/protection: Implant    Comment: nexplanon       She does reports new sexual partner(s) in the last year. Preventive Medicine History:  Her most recent Pap smear result: not obtained d/t age  Her most recent HR HPV screen was not obtained    She does not have a history of RISA 2, 3 or cervical cancer. Past Medical History:   Diagnosis Date    Acne     Encounter for breast cancer screening using non-mammogram modality     BI-RADS Assessment Category 2: Benign finding. Palpable lump in the    Mood disorder (HCC)     bipolar    Nexplanon insertion 2019     OB History    Para Term  AB Living   0 0 0 0 0 0   SAB TAB Ectopic Molar Multiple Live Births   0 0 0   0       History reviewed. No pertinent surgical history.   Family History   Problem Relation Age of Onset    Hypertension Maternal Grandfather     Cancer Maternal Grandfather         kidney cancer    Breast Cancer Maternal Grandmother     No Known Problems Mother     No Known Problems Father      Social History     Socioeconomic History    Marital status: SINGLE     Spouse name: Not on file    Number of children: Not on file    Years of education: Not on file    Highest education level: Not on file   Occupational History    Not on file   Social Needs    Financial resource strain: Not on file    Food insecurity     Worry: Not on file     Inability: Not on file    Transportation needs     Medical: Not on file     Non-medical: Not on file   Tobacco Use    Smoking status: Never Smoker    Smokeless tobacco: Never Used   Substance and Sexual Activity    Alcohol use: Yes     Alcohol/week: 0.0 standard drinks    Drug use: Never    Sexual activity: Yes     Partners: Female     Birth control/protection: Implant     Comment: nexplanon   Lifestyle    Physical activity     Days per week: Not on file     Minutes per session: Not on file    Stress: Not on file   Relationships    Social connections     Talks on phone: Not on file     Gets together: Not on file     Attends [employer] service: Not on file     Active member of club or organization: Not on file     Attends meetings of clubs or organizations: Not on file     Relationship status: Not on file    Intimate partner violence     Fear of current or ex partner: Not on file     Emotionally abused: Not on file     Physically abused: Not on file     Forced sexual activity: Not on file   Other Topics Concern    Not on file   Social History Narrative    Not on file       No Known Allergies    Current Outpatient Medications   Medication Sig    sertraline (ZOLOFT) 50 mg tablet TAKE 1 TABLET BY MOUTH EVERYDAY AT BEDTIME    conjugated estrogens (PREMARIN) 1.25 mg tablet 1po every day x 10-21 days prn abnormal bleeding    lamoTRIgine (LaMICtal) 200 mg tablet TAKE 1 TABLET BY MOUTH EVERYDAY AT BEDTIME    spironolactone (ALDACTONE) 100 mg tablet TAKE 1 TABLET BY MOUTH EVERY DAY AT NIGHT    traZODone (DESYREL) 50 mg tablet TAKE 1 2 ORAL TABLETS AS NEEDED FOR SLEEP    etonogestreL (Nexplanon) 68 mg impl by SubCUTAneous route.     Solosec 2 gram grdp TAKE 1 DOSE PACK ONCE FOR 1 DOSE. SPRINKLE ON SOFT FOOD, DO NOT CHEW.    TRI-PREVIFEM, 28, 0.18/0.215/0.25 mg-35 mcg (28) tab TAKE 1 TABLET BY MOUTH EVERY DAY    SERTRALINE HCL (ZOLOFT PO) Take  by mouth.  OXCARBAZEPINE (TRILEPTAL PO) Take  by mouth. No current facility-administered medications for this visit. There is no problem list on file for this patient.         Review of Systems - History obtained from the patient and patient filled out questionnaire   Constitutional/general, HEENT, CV, Resp, GI, MSK, Neuro, Psych, Heme/lymph, Skin, Breast ROS: no significant complaints except as noted on HPI    Physical Exam  Visit Vitals  /72 (BP 1 Location: Right upper arm, BP Patient Position: Sitting, BP Cuff Size: Adult)   Ht 5' 7\" (1.702 m)   Wt 155 lb (70.3 kg)   LMP 02/12/2021 (Within Days)   BMI 24.28 kg/m²       Constitutional  · Appearance: well-nourished, well developed, alert, in no acute distress    HENT  · Head and Face: appears normal    Neck  · Inspection/Palpation: normal appearance, no masses or tenderness  · Lymph Nodes: no lymphadenopathy present  · Thyroid: gland size normal, nontender, no nodules or masses present on palpation    Chest  · Respiratory Effort: breathing unlabored  · Auscultation: normal breath sounds    Cardiovascular  · Heart:  · Auscultation: regular rate and rhythm without murmur    Breasts  · Inspection of Breasts: breasts symmetrical, no skin changes, no discharge present, nipple appearance normal, no skin retraction present  · Palpation of Breasts and Axillae: no masses present on palpation, no breast tenderness  · Axillary Lymph Nodes: no lymphadenopathy present    Gastrointestinal  · Abdominal Examination: abdomen non-tender to palpation, normal bowel sounds, no masses present  · Liver and spleen: no hepatomegaly present, spleen not palpable  · Hernias: no hernias identified    Genitourinary  · External Genitalia: normal appearance for age, no discharge present, no tenderness present, no inflammatory lesions present, no masses present  · Vagina: normal vaginal vault without central or paravaginal defects, minimal white discharge present, no inflammatory lesions present, no masses present  · Bladder: non-tender to palpation  · Urethra: appears normal  · Cervix: normal   · Uterus: normal size, shape and consistency  · Adnexa: no adnexal tenderness present, no adnexal masses present  · Perineum: perineum within normal limits, no evidence of trauma, no rashes or skin lesions present  · Anus: anus within normal limits, no hemorrhoids present  · Inguinal Lymph Nodes: no lymphadenopathy present    Skin  · General Inspection: no rash, no lesions identified  · nexplanon in place LUE    Neurologic/Psychiatric  · Mental Status:  · Orientation: grossly oriented to person, place and time  · Mood and Affect: mood normal, affect appropriate    Assessment:  23 y.o.  for well woman exam  Encounter Diagnoses   Name Primary?  Encounter for Papanicolaou smear for cervical cancer screening Yes    Encounter for gynecological examination (general) (routine) without abnormal findings     Venereal disease screening     Abnormal uterine bleeding (AUB)     Nexplanon in place        Plan:  The patient was counseled about diet, exercise, healthy lifestyle  We discussed current pap smear and HR HPV testing guidelines. I recommended follow up one year for routine annual gynecologic exam or sooner prn  Handouts were given to the patient  I recommended follow up with a primary care physician for chronic medical problems and evaluation of non-gynecologic concerns and to please contact our office with any GYN questions or concerns. I recommended testing per CDC guidelines and at patient request.   Add ERT, see if aub improves  Labs  Disc aub with nexplanon, notify MD if NI  Premarin  Discussed risks, benefits and alternatives of ERT: including but not limited to dvt/pe/mi/cva/ca/gi risks.  We discussed non-hormonal alternatives to ERT and management of symptoms. We discussed progesterone only and non hormonal options for contraception including but not limited to condoms, IUDs, Nexplanon, and depo provera. Folllow up:  [x] return for annual well woman exam in one year or sooner if she is having problems  [] follow up and ultrasound  [] 6 months  [] 3 months  [] 6 weeks   [] 1 month    Orders Placed This Encounter    TSH 3RD GENERATION    CBC W/O DIFF    conjugated estrogens (PREMARIN) 1.25 mg tablet    PAP IG, CT-NG, RFX APTIMA HPV ASCUS ([id number], [id number])       No results found for any visits on 03/03/21.

## 2021-03-04 LAB
ERYTHROCYTE [DISTWIDTH] IN BLOOD BY AUTOMATED COUNT: 13.1 % (ref 11.5–14.5)
HCT VFR BLD AUTO: 39.8 % (ref 35–47)
HGB BLD-MCNC: 12.8 G/DL (ref 11.5–16)
MCH RBC QN AUTO: 29.2 PG (ref 26–34)
MCHC RBC AUTO-ENTMCNC: 32.2 G/DL (ref 30–36.5)
MCV RBC AUTO: 90.9 FL (ref 80–99)
NRBC # BLD: 0 K/UL (ref 0–0.01)
NRBC BLD-RTO: 0 PER 100 WBC
PLATELET # BLD AUTO: 290 K/UL (ref 150–400)
PMV BLD AUTO: 10.7 FL (ref 8.9–12.9)
RBC # BLD AUTO: 4.38 M/UL (ref 3.8–5.2)
TSH SERPL DL<=0.05 MIU/L-ACNC: 0.61 UIU/ML (ref 0.36–3.74)
WBC # BLD AUTO: 6.8 K/UL (ref 3.6–11)

## 2021-03-08 LAB
C TRACH RRNA CVX QL NAA+PROBE: NEGATIVE
CYTOLOGIST CVX/VAG CYTO: NORMAL
CYTOLOGY CVX/VAG DOC CYTO: NORMAL
CYTOLOGY CVX/VAG DOC THIN PREP: NORMAL
DX ICD CODE: NORMAL
LABCORP, 190119: NORMAL
Lab: NORMAL
N GONORRHOEA RRNA CVX QL NAA+PROBE: NEGATIVE
OTHER STN SPEC: NORMAL
STAT OF ADQ CVX/VAG CYTO-IMP: NORMAL

## 2021-03-09 NOTE — PROGRESS NOTES
Normal pap smear, message sent if [address] active. Update PMH/HM: include: Date of pap, Cytology: wnl. For HR HPV results: list NEG or POS, when done.

## 2021-03-17 ENCOUNTER — TELEPHONE (OUTPATIENT)
Dept: OBGYN CLINIC | Age: 21
End: 2021-03-17

## 2021-03-17 NOTE — TELEPHONE ENCOUNTER
Call received at 1:25PM      23year old patient last seen in the office on 3/3/2021    Patient calling about her lab work done on 3/3/201    Patient advised of MD reviewed labs and recommendations     See result notes    Patient advised of MD sent prescription and to contact the pharmacy    Patient verbalized understanding.

## 2021-03-17 NOTE — TELEPHONE ENCOUNTER
She can try ibuprofen 800 mg TID x 5 days     Do they have a preferred estrogen tablet that they will cover?     Janes Gomez MD

## 2021-03-17 NOTE — TELEPHONE ENCOUNTER
Patient was prescribed Premarin 1.25 mg tabs to take day 10-21 for AUB. Her insurance does not cover this medication. .     Is there anything else you could try, different rx?

## 2021-03-18 NOTE — TELEPHONE ENCOUNTER
Patient called her insurance and the only estrogen that they cover is Estratil. Premarin was not a covered product. Would you be willing to try this?

## 2021-03-19 NOTE — TELEPHONE ENCOUNTER
I am not familiar with estratil. Does it have another name? Estradiol?     If it is estradiol  Rx: estradiol 1 mg  1po qd  X 10-21 days prn abnormal uterine bleeding

## 2021-03-22 NOTE — TELEPHONE ENCOUNTER
03/22/21  850 am spoke with patient and advised to call her insurance and ask about Estradiol 1 mg tab to see if this is covered

## 2021-03-25 RX ORDER — ESTRADIOL 1 MG/1
TABLET ORAL
Qty: 21 TAB | Refills: 1 | Status: SHIPPED | OUTPATIENT
Start: 2021-03-25 | End: 2021-04-21

## 2021-03-25 NOTE — TELEPHONE ENCOUNTER
Call received at 2:30PM      Patient calling back to say that her insurance company will cover the estradiol 1 mg    Prescription pended for amend/sign      Patient verbalized understanding.

## 2021-03-26 NOTE — TELEPHONE ENCOUNTER
Prescription has been sent by MD   Patient advised of sent prescription and verbalized understanding

## 2021-04-21 ENCOUNTER — OFFICE VISIT (OUTPATIENT)
Dept: OBGYN CLINIC | Age: 21
End: 2021-04-21
Payer: COMMERCIAL

## 2021-04-21 VITALS
DIASTOLIC BLOOD PRESSURE: 78 MMHG | SYSTOLIC BLOOD PRESSURE: 117 MMHG | HEART RATE: 84 BPM | WEIGHT: 156.8 LBS | BODY MASS INDEX: 24.56 KG/M2

## 2021-04-21 DIAGNOSIS — N93.9 ABNORMAL UTERINE BLEEDING (AUB): Primary | ICD-10-CM

## 2021-04-21 PROCEDURE — 11982 REMOVE DRUG IMPLANT DEVICE: CPT | Performed by: OBSTETRICS & GYNECOLOGY

## 2021-04-21 NOTE — PROGRESS NOTES
[facility]  [email or web address]  [phone]    Yon Giraldo MD, FACOG       Procedure: Nexplanon Removal        Chart reviewed for the following:   I, Caden Fajardo MD, have reviewed the History, Physical and updated the Allergic reactions for [place] performed immediately prior to start of procedure:   Shay Vargas MD, have performed the following reviews on [address] [place] [address] prior to the start of the procedure:            * Patient was identified by name and date of birth   * Agreement on procedure being performed was verified  * Risks and Benefits explained to the patient  * Procedure site verified and marked as necessary  * Patient was positioned for comfort  * Consent was signed and verified     Time: 2:50pm    Date of procedure: 4/21/2021    Procedure performed by: Caden Fajardo MD    Provider assisted by:   Javier Gotti LPN. Patient assisted by: self    How tolerated by patient: tolerated the procedure well with no complications    Post Procedural Pain Scale: 0 - No Hurt    Comments: none    Procedure: The patient presents for office removal of a NEXPLANON sub-dermal contraceptive implant. Patient elects removal because she is having random vaginal bleeding & does not need birth control. She was positioned so the site of her implant was visable and easily accessible. The implant was located by palpation. The end of the implant nearest the elbow was marked with a sterile marker. The operative site was cleansed with Betadine. Using a 27 gauge needle on a 5 cc syringe and 1% lidocaine, 3 cc were infiltrated as a intradermal wheal and underneath the end of the implant closest to the elbow. Downward pressure was applied on the end of the implant nearest the axilla and a 2-3mm incision was made in the longitudinal direction of the arm at the tip of the implant closest to the elbow.  The implant was then pushed gently toward the incision until the tip was visible. The fibrous capsule was opened with a combination of blunt and sharp dissection. The implant was grasped with mosquito forceps and removed intact. It measured a full 4 cm in length. The skin was cleansed and dried. A steristrip was applied then topped with a folded 4x4 gauze and a Curlex pressure dressing. There were no complication or problems. She demonstrated full active range of movement of her elbow, wrist, all five digits and denied numbness and tingling. Post-procedure:  She was told to remove the pressure dressing in 12-24 hours, to keep the incision area dry for 24 hours and to remove the Steristrip in several days. She was instructed to contact the office with any questions or concerns and bleeding and infection precautions were reviewed with her. We discussed typical bleeding patterns after removal of the Nexplanon. Recommend follow up for well woman exam or sooner prn. Declines alternatives, female partners.

## 2021-07-11 ENCOUNTER — HOSPITAL ENCOUNTER (EMERGENCY)
Age: 21
Discharge: HOME | End: 2021-07-12
Attending: EMERGENCY MEDICINE
Payer: COMMERCIAL

## 2021-07-11 DIAGNOSIS — E86.0 DEHYDRATION: ICD-10-CM

## 2021-07-11 DIAGNOSIS — R19.7 DIARRHEA, UNSPECIFIED TYPE: ICD-10-CM

## 2021-07-11 DIAGNOSIS — E32.0 LARGE THYMUS (HCC): ICD-10-CM

## 2021-07-11 DIAGNOSIS — J36 TONSILLAR ABSCESS: Primary | ICD-10-CM

## 2021-07-11 LAB — HCG UR QL: NEGATIVE

## 2021-07-11 PROCEDURE — 74011250637 HC RX REV CODE- 250/637: Performed by: EMERGENCY MEDICINE

## 2021-07-11 PROCEDURE — 99284 EMERGENCY DEPT VISIT MOD MDM: CPT

## 2021-07-11 PROCEDURE — 81025 URINE PREGNANCY TEST: CPT

## 2021-07-11 PROCEDURE — 96361 HYDRATE IV INFUSION ADD-ON: CPT

## 2021-07-11 PROCEDURE — 96375 TX/PRO/DX INJ NEW DRUG ADDON: CPT

## 2021-07-11 PROCEDURE — 96365 THER/PROPH/DIAG IV INF INIT: CPT

## 2021-07-11 RX ORDER — ONDANSETRON 4 MG/1
4 TABLET, ORALLY DISINTEGRATING ORAL
Status: COMPLETED | OUTPATIENT
Start: 2021-07-11 | End: 2021-07-11

## 2021-07-11 RX ORDER — IBUPROFEN 600 MG/1
600 TABLET ORAL
Status: COMPLETED | OUTPATIENT
Start: 2021-07-11 | End: 2021-07-11

## 2021-07-11 RX ADMIN — IBUPROFEN 600 MG: 600 TABLET, FILM COATED ORAL at 23:40

## 2021-07-11 RX ADMIN — ONDANSETRON 4 MG: 4 TABLET, ORALLY DISINTEGRATING ORAL at 22:35

## 2021-07-12 ENCOUNTER — APPOINTMENT (OUTPATIENT)
Dept: CT IMAGING | Age: 21
End: 2021-07-12
Attending: EMERGENCY MEDICINE
Payer: COMMERCIAL

## 2021-07-12 VITALS
RESPIRATION RATE: 16 BRPM | OXYGEN SATURATION: 98 % | HEART RATE: 84 BPM | DIASTOLIC BLOOD PRESSURE: 92 MMHG | SYSTOLIC BLOOD PRESSURE: 131 MMHG | TEMPERATURE: 98.6 F

## 2021-07-12 LAB
ALBUMIN SERPL-MCNC: 4.4 G/DL (ref 3.5–5)
ALBUMIN/GLOB SERPL: 0.9 {RATIO} (ref 1.1–2.2)
ALP SERPL-CCNC: 100 U/L (ref 45–117)
ALT SERPL-CCNC: 20 U/L (ref 12–78)
ANION GAP SERPL CALC-SCNC: 5 MMOL/L (ref 5–15)
AST SERPL-CCNC: 15 U/L (ref 15–37)
BASOPHILS # BLD: 0 K/UL (ref 0–0.1)
BASOPHILS NFR BLD: 0 % (ref 0–1)
BILIRUB SERPL-MCNC: 0.4 MG/DL (ref 0.2–1)
BUN SERPL-MCNC: 5 MG/DL (ref 6–20)
BUN/CREAT SERPL: 6 (ref 12–20)
CALCIUM SERPL-MCNC: 9.8 MG/DL (ref 8.5–10.1)
CAMPYLOBACTER SPECIES, DNA: NEGATIVE
CHLORIDE SERPL-SCNC: 105 MMOL/L (ref 97–108)
CO2 SERPL-SCNC: 26 MMOL/L (ref 21–32)
COMMENT, HOLDF: NORMAL
CREAT SERPL-MCNC: 0.85 MG/DL (ref 0.55–1.02)
DIFFERENTIAL METHOD BLD: ABNORMAL
ENTEROTOXIGEN E COLI, DNA: NEGATIVE
EOSINOPHIL # BLD: 0 K/UL (ref 0–0.4)
EOSINOPHIL NFR BLD: 0 % (ref 0–7)
ERYTHROCYTE [DISTWIDTH] IN BLOOD BY AUTOMATED COUNT: 12.8 % (ref 11.5–14.5)
GLOBULIN SER CALC-MCNC: 4.7 G/DL (ref 2–4)
GLUCOSE SERPL-MCNC: 102 MG/DL (ref 65–100)
HCT VFR BLD AUTO: 43.4 % (ref 35–47)
HETEROPH AB BLD QL IA: NEGATIVE
HGB BLD-MCNC: 14.1 G/DL (ref 11.5–16)
IMM GRANULOCYTES # BLD AUTO: 0.1 K/UL (ref 0–0.04)
IMM GRANULOCYTES NFR BLD AUTO: 1 % (ref 0–0.5)
LYMPHOCYTES # BLD: 0.7 K/UL (ref 0.8–3.5)
LYMPHOCYTES NFR BLD: 5 % (ref 12–49)
MCH RBC QN AUTO: 28.8 PG (ref 26–34)
MCHC RBC AUTO-ENTMCNC: 32.5 G/DL (ref 30–36.5)
MCV RBC AUTO: 88.6 FL (ref 80–99)
MONOCYTES # BLD: 1 K/UL (ref 0–1)
MONOCYTES NFR BLD: 7 % (ref 5–13)
NEUTS SEG # BLD: 12 K/UL (ref 1.8–8)
NEUTS SEG NFR BLD: 87 % (ref 32–75)
NRBC # BLD: 0 K/UL (ref 0–0.01)
NRBC BLD-RTO: 0 PER 100 WBC
P SHIGELLOIDES DNA STL QL NAA+PROBE: NEGATIVE
PLATELET # BLD AUTO: 351 K/UL (ref 150–400)
PMV BLD AUTO: 10 FL (ref 8.9–12.9)
POTASSIUM SERPL-SCNC: 3.6 MMOL/L (ref 3.5–5.1)
PROT SERPL-MCNC: 9.1 G/DL (ref 6.4–8.2)
RBC # BLD AUTO: 4.9 M/UL (ref 3.8–5.2)
RBC MORPH BLD: ABNORMAL
SALMONELLA SPECIES, DNA: NEGATIVE
SAMPLES BEING HELD,HOLD: NORMAL
SHIGA TOXIN PRODUCING, DNA: NEGATIVE
SHIGELLA SP+EIEC IPAH STL QL NAA+PROBE: NEGATIVE
SODIUM SERPL-SCNC: 136 MMOL/L (ref 136–145)
VIBRIO SPECIES, DNA: NEGATIVE
WBC # BLD AUTO: 13.8 K/UL (ref 3.6–11)
WBC MORPH BLD: ABNORMAL
Y. ENTEROCOLITICA, DNA: NEGATIVE

## 2021-07-12 PROCEDURE — 85025 COMPLETE CBC W/AUTO DIFF WBC: CPT

## 2021-07-12 PROCEDURE — 86308 HETEROPHILE ANTIBODY SCREEN: CPT

## 2021-07-12 PROCEDURE — 36415 COLL VENOUS BLD VENIPUNCTURE: CPT

## 2021-07-12 PROCEDURE — 74011250636 HC RX REV CODE- 250/636: Performed by: EMERGENCY MEDICINE

## 2021-07-12 PROCEDURE — 87506 IADNA-DNA/RNA PROBE TQ 6-11: CPT

## 2021-07-12 PROCEDURE — 74011000636 HC RX REV CODE- 636: Performed by: RADIOLOGY

## 2021-07-12 PROCEDURE — 70491 CT SOFT TISSUE NECK W/DYE: CPT

## 2021-07-12 PROCEDURE — 74011250637 HC RX REV CODE- 250/637: Performed by: EMERGENCY MEDICINE

## 2021-07-12 PROCEDURE — 80053 COMPREHEN METABOLIC PANEL: CPT

## 2021-07-12 RX ORDER — DEXAMETHASONE SODIUM PHOSPHATE 10 MG/ML
10 INJECTION INTRAMUSCULAR; INTRAVENOUS
Status: COMPLETED | OUTPATIENT
Start: 2021-07-12 | End: 2021-07-12

## 2021-07-12 RX ORDER — HYDROCODONE BITARTRATE AND ACETAMINOPHEN 5; 325 MG/1; MG/1
1 TABLET ORAL
Qty: 7 TABLET | Refills: 0 | Status: SHIPPED | OUTPATIENT
Start: 2021-07-12 | End: 2021-07-15

## 2021-07-12 RX ORDER — KETOROLAC TROMETHAMINE 30 MG/ML
30 INJECTION, SOLUTION INTRAMUSCULAR; INTRAVENOUS
Status: DISCONTINUED | OUTPATIENT
Start: 2021-07-12 | End: 2021-07-12

## 2021-07-12 RX ORDER — HYDROCODONE BITARTRATE AND ACETAMINOPHEN 5; 325 MG/1; MG/1
1 TABLET ORAL
Status: COMPLETED | OUTPATIENT
Start: 2021-07-12 | End: 2021-07-12

## 2021-07-12 RX ORDER — CLINDAMYCIN PHOSPHATE 150 MG/ML
600 INJECTION, SOLUTION INTRAVENOUS ONCE
Status: DISCONTINUED | OUTPATIENT
Start: 2021-07-12 | End: 2021-07-12 | Stop reason: CLARIF

## 2021-07-12 RX ORDER — CLINDAMYCIN PHOSPHATE 600 MG/50ML
600 INJECTION INTRAVENOUS ONCE
Status: COMPLETED | OUTPATIENT
Start: 2021-07-12 | End: 2021-07-12

## 2021-07-12 RX ORDER — AMOXICILLIN 875 MG/1
875 TABLET, FILM COATED ORAL 2 TIMES DAILY
COMMUNITY
Start: 2021-07-10 | End: 2021-07-12

## 2021-07-12 RX ORDER — CLINDAMYCIN HYDROCHLORIDE 300 MG/1
300 CAPSULE ORAL 4 TIMES DAILY
Qty: 40 CAPSULE | Refills: 0 | Status: SHIPPED | OUTPATIENT
Start: 2021-07-12 | End: 2021-07-22

## 2021-07-12 RX ADMIN — CLINDAMYCIN PHOSPHATE 600 MG: 600 INJECTION, SOLUTION INTRAVENOUS at 03:39

## 2021-07-12 RX ADMIN — HYDROCODONE BITARTRATE AND ACETAMINOPHEN 1 TABLET: 5; 325 TABLET ORAL at 03:47

## 2021-07-12 RX ADMIN — SODIUM CHLORIDE 1000 ML: 9 INJECTION, SOLUTION INTRAVENOUS at 03:15

## 2021-07-12 RX ADMIN — SODIUM CHLORIDE 1000 ML: 9 INJECTION, SOLUTION INTRAVENOUS at 01:01

## 2021-07-12 RX ADMIN — IOPAMIDOL 100 ML: 612 INJECTION, SOLUTION INTRAVENOUS at 03:00

## 2021-07-12 RX ADMIN — DEXAMETHASONE SODIUM PHOSPHATE 10 MG: 10 INJECTION, SOLUTION INTRAMUSCULAR; INTRAVENOUS at 01:02

## 2021-07-12 NOTE — ED NOTES
Bedside shift change report given to JOY Pitt (oncoming nurse) by Jethro Zaragoza RN   (offgoing nurse). Report included the following information SBAR, ED Summary and MAR. Problem: Adult Inpatient Plan of Care  Goal: Plan of Care Review  Outcome: Ongoing (interventions implemented as appropriate)  Tolerated Cytoxan treatment well.  Advised to call MD for any problems or concerns.  AVS given. RTC as schedule.  NAD noted upon discharge.

## 2021-07-12 NOTE — ED NOTES
Pt discharged home with parent/guardian. Pt acting age appropriately, respirations regular and unlabored, cap refill less than two seconds. Parent/guardian verbalized understanding of discharge paperwork and has no further questions at this time. Patient given discharge instructions. Patient ambulatory out of the department with father. Education:  Patient encouraged to follow up with ENT MD today. Instructed on antibiotic and pain medication dosing. Reassessment:  Patient states decrease in throat pain at time of discharge.

## 2021-07-12 NOTE — ED TRIAGE NOTES
Pt being treated for strep amox day 2, flu negative. Unable to eat or drink for 2 days, starting to feel SOB with exertion.  States she lost 6lbs since being sick

## 2021-07-12 NOTE — ED NOTES
Pt tolerated 20 G PIV to pt's R AC well. Fluid bolus started and running without difficulty. Pt medicated with IV Decadron. Bloodwork collected and sent to lab. Pt aware to call out when she needs to use the restroom to provide a stool sample.  Mother at bedside, call bell within reach

## 2021-07-12 NOTE — DISCHARGE INSTRUCTIONS
It is recommended that you have a follow-up chest CT in 8 to 12 weeks regarding the thymic tissue. Call to schedule an appointment with ENT for today to have the tonsillar abscess drained. The mono antibody panel is pending.

## 2021-07-12 NOTE — ED PROVIDER NOTES
HPI     Please note that this dictation was completed with [employer], the computer voice recognition software. Quite often unanticipated grammatical, syntax, homophones, and other interpretive errors are inadvertently transcribed by the computer software. Please disregard these errors. Please excuse any errors that have escaped final proofreading. 65-year-old healthy female here with sore throat, vomiting and diarrhea. Patient seen at [facility] on Wednesday. Negative rapid strep. No throat culture sent. Currently has developed vomiting and diarrhea. She has vomited once in the last 24 hours. She is having constant nonbloody diarrhea. Decreased p.o. intake. Complains of severe sore throat and right tonsil worse than left in the size. She was started on amoxicillin yesterday and is taken 2 doses. It was presumed that she had strep even though the rapid strep was negative. No throat culture has been set at any point. She was tested negative for mono yesterday. Denies any fevers at any point. Mom concerned about possible peritonsillar abscess. Decreased p.o. intake. Positive weight loss. No known sick contacts. No other complaints at this time. Social history: Non-smoker. Positive alcohol. Past Medical History:   Diagnosis Date    Acne     Encounter for breast cancer screening using non-mammogram modality     BI-RADS Assessment Category 2: Benign finding. Palpable lump in the    Mood disorder (HCC)     bipolar    Nexplanon insertion 06/04/2019    Routine Papanicolaou smear 03/03/2021    neg       History reviewed. No pertinent surgical history.       Family History:   Problem Relation Age of Onset    Hypertension Maternal Grandfather     Cancer Maternal Grandfather         kidney cancer    Breast Cancer Maternal Grandmother     No Known Problems Mother     No Known Problems Father        Social History     Socioeconomic History    Marital status: SINGLE     Spouse name: Not on file    Number of children: Not on file    Years of education: Not on file    Highest education level: Not on file   Occupational History    Not on file   Tobacco Use    Smoking status: Never Smoker    Smokeless tobacco: Never Used   Substance and Sexual Activity    Alcohol use: Yes     Alcohol/week: 0.0 standard drinks    Drug use: Yes     Types: Marijuana     Comment: occasional    Sexual activity: Yes     Partners: Female     Birth control/protection: Implant     Comment: nexplanon   Other Topics Concern    Not on file   Social History Narrative    Not on file     Social Determinants of Health     Financial Resource Strain:     Difficulty of Paying Living Expenses:    Food Insecurity:     Worried About Running Out of Food in the Last Year:     [address] in the Last Year:    Transportation Needs:     Lack of Transportation (Medical):  Lack of Transportation (Non-Medical):    Physical Activity:     Days of Exercise per Week:     Minutes of Exercise per Session:    Stress:     Feeling of Stress :    Social Connections:     Frequency of Communication with Friends and Family:     Frequency of Social Gatherings with Friends and Family:     Attends [employer] Services:     Active Member of Clubs or Organizations:     Attends Club or Organization Meetings:     Marital Status:    Intimate Partner Violence:     Fear of Current or Ex-Partner:     Emotionally Abused:     Physically Abused:     Sexually Abused: ALLERGIES: Patient has no known allergies. Review of Systems   Constitutional: Negative for chills and fever. HENT: Positive for sore throat. Respiratory: Negative for cough. Gastrointestinal: Positive for diarrhea, nausea and vomiting. Negative for abdominal pain. All other systems reviewed and are negative.       Vitals:    07/11/21 2221   BP: 135/82   Pulse: 99   Resp: 20   Temp: 98.6 °F (37 °C)   SpO2: 99%            Physical Exam  Vitals and nursing note reviewed. Constitutional:       Appearance: She is well-developed. HENT:      Head: Normocephalic and atraumatic. Mouth/Throat:      Mouth: Mucous membranes are dry. Pharynx: Posterior oropharyngeal erythema present. No oropharyngeal exudate. Comments: 3+ right and 1+ left tonsil  Eyes:      General: No scleral icterus. Right eye: No discharge. Left eye: No discharge. Conjunctiva/sclera: Conjunctivae normal.      Pupils: Pupils are equal, round, and reactive to light. Cardiovascular:      Rate and Rhythm: Normal rate and regular rhythm. Heart sounds: Normal heart sounds. No murmur heard. No friction rub. No gallop. Pulmonary:      Effort: Pulmonary effort is normal. No respiratory distress. Breath sounds: Normal breath sounds. No wheezing or rales. Abdominal:      General: Bowel sounds are normal. There is no distension. Palpations: Abdomen is soft. Tenderness: There is no abdominal tenderness. There is no guarding. Musculoskeletal:         General: No tenderness. Normal range of motion. Cervical back: Normal range of motion and neck supple. Lymphadenopathy:      Cervical: No cervical adenopathy. Skin:     General: Skin is warm and dry. Coloration: Skin is not pale. Findings: No rash. Neurological:      Mental Status: She is alert and oriented to person, place, and time. Cranial Nerves: No cranial nerve deficit. Coordination: Coordination normal.              MDM   60-year-old female here with vomiting, diarrhea, pharyngitis. She does have asymmetric tonsils but no hot potato voice. Certainly mono is a possibility. She was Monospot yesterday but that could be a false negative. We will recheck it today. If the mono is positive, I would treat with clindamycin and give the dexamethasone. She could have close follow-up with ENT rather than exposing her to a CT scan and high radiation.     Procedures          1:59 AM  Mono negative. Will order ct soft tissue neck to evaluate for PTA. Pt with continued bouts of diarrhea in the ED, but not liquid enough for c. Diff testing yet. Jluis Solis MD    3:10 AM  Right tonsilar abscess. Pt feels better. Will consult ENT       3:39 AM  Discussed with Dr. Sang Lazo of ENT. He states to have the patient call at 8 AM to have an appointment for today. Agrees with clindamycin. Updated patient on results and plan. Will give hydrocodone for pain since it is returning. 3:42 AM  Updated patient on the CT findings as well and the need for follow-up chest CT. Kirstin Duran Recent Results (from the past 24 hour(s))   HCG URINE, QL. - POC    Collection Time: 07/11/21 11:42 PM   Result Value Ref Range    Pregnancy test,urine (POC) Negative NEG     SAMPLES BEING HELD    Collection Time: 07/12/21  1:06 AM   Result Value Ref Range    SAMPLES BEING HELD  2 RED, 1 PST     COMMENT        Add-on orders for these samples will be processed based on acceptable specimen integrity and analyte stability, which may vary by analyte. METABOLIC PANEL, COMPREHENSIVE    Collection Time: 07/12/21  1:06 AM   Result Value Ref Range    Sodium 136 136 - 145 mmol/L    Potassium 3.6 3.5 - 5.1 mmol/L    Chloride 105 97 - 108 mmol/L    CO2 26 21 - 32 mmol/L    Anion gap 5 5 - 15 mmol/L    Glucose 102 (H) 65 - 100 mg/dL    BUN 5 (L) 6 - 20 MG/DL    Creatinine 0.85 0.55 - 1.02 MG/DL    BUN/Creatinine ratio 6 (L) 12 - 20      GFR est AA >60 >60 ml/min/1.73m2    GFR est non-AA >60 >60 ml/min/1.73m2    Calcium 9.8 8.5 - 10.1 MG/DL    Bilirubin, total 0.4 0.2 - 1.0 MG/DL    ALT (SGPT) 20 12 - 78 U/L    AST (SGOT) 15 15 - 37 U/L    Alk.  phosphatase 100 45 - 117 U/L    Protein, total 9.1 (H) 6.4 - 8.2 g/dL    Albumin 4.4 3.5 - 5.0 g/dL    Globulin 4.7 (H) 2.0 - 4.0 g/dL    A-G Ratio 0.9 (L) 1.1 - 2.2     CBC WITH AUTOMATED DIFF    Collection Time: 07/12/21  1:06 AM   Result Value Ref Range    WBC 13.8 (H) 3.6 - 11.0 K/uL RBC 4.90 3.80 - 5.20 M/uL    HGB 14.1 11.5 - 16.0 g/dL    HCT 43.4 35.0 - 47.0 %    MCV 88.6 80.0 - 99.0 FL    MCH 28.8 26.0 - 34.0 PG    MCHC 32.5 30.0 - 36.5 g/dL    RDW 12.8 11.5 - 14.5 %    PLATELET 344 532 - 766 K/uL    MPV 10.0 8.9 - 12.9 FL    NRBC 0.0 0  WBC    ABSOLUTE NRBC 0.00 0.00 - 0.01 K/uL    NEUTROPHILS 87 (H) 32 - 75 %    LYMPHOCYTES 5 (L) 12 - 49 %    MONOCYTES 7 5 - 13 %    EOSINOPHILS 0 0 - 7 %    BASOPHILS 0 0 - 1 %    IMMATURE GRANULOCYTES 1 (H) 0.0 - 0.5 %    ABS. NEUTROPHILS 12.0 (H) 1.8 - 8.0 K/UL    ABS. LYMPHOCYTES 0.7 (L) 0.8 - 3.5 K/UL    ABS. MONOCYTES 1.0 0.0 - 1.0 K/UL    ABS. EOSINOPHILS 0.0 0.0 - 0.4 K/UL    ABS. BASOPHILS 0.0 0.0 - 0.1 K/UL    ABS. IMM. GRANS. 0.1 (H) 0.00 - 0.04 K/UL    DF SMEAR SCANNED      RBC COMMENTS NORMOCYTIC, NORMOCHROMIC      WBC COMMENTS REACTIVE LYMPHS     MONONUCLEOSIS SCREEN    Collection Time: 07/12/21  1:06 AM   Result Value Ref Range    Mononucleosis screen Negative NEG         CT NECK SOFT TISSUE W CONT    Result Date: 7/12/2021  NECK CT WITH CONTRAST: 7/12/2021 2:42 AM INDICATION: Sore throat, tonsillar asymmetry, abscess. COMPARISON: None. TECHNIQUE: Axial images were obtained from the skull base to the level of the xochitl after the administration 100 cc IV Isovue-300. Coronal and sagittal reconstructions were performed. CT dose reduction was achieved through use of a standardized protocol tailored for this examination and automatic exposure control for dose modulation. FINDINGS: The bilateral faucine tonsils are enlarged. A right tonsillar abscess measures 19 x 22 x 33 mm. Bilateral, reactive, level 2, cervical lymph nodes are mildly enlarged. The nasopharynx and larynx are normal. The pterygopalatine fossa and parapharyngeal fat are normal. The carotid-jugular spaces are unremarkable bilaterally.  The parotid and submandibular glands are normal. The visualized portions of the brain are normal. Partially imaged soft tissue in the anterior mediastinum is indeterminate, but may represent residual or reactive thymic tissue. Left vertebral artery is replaced from the aortic arch, a normal variant. The visualized upper lung fields are clear. 1. Right tonsillar abscess. 2. Reactive level 2 cervical lymph nodes. 3. Partially imaged, indeterminate, anterior mediastinal soft tissue. This may represent residual or reactive thymic tissue. Consider follow-up chest CT in 8-12 weeks.

## 2022-04-14 NOTE — PROGRESS NOTES
[facility]  [email or web address]  [phone]    Main Martinez MD, FACOG       Annual Gynecologic Exam:  WWE <40  No chief complaint on file. wwvinay Cueva is a 24 y.o.  WHITE/NON- female who presents for an annual well woman exam.  No LMP recorded. .    She reports the following additional concerns: C/o uterus pain during her cycle (an aching pain rather than cramping) for the past few months, sometimes pain is un bearable. No hx of ovarian cysts or fibroids, no prior ultrasound done before. Pt is interested in [employer]/ [employer], she liked it in the past until her 3rd year, then she was having irregular bleeding & had it removed 2021. She cant take OCP d/t mood stabilizer. Wants STD testing. Menstrual status:  She does report dysmenorrhea/painful menses. She does not report heavy menses. She does not report irregular bleeding. Sexual history and Contraception:  Social History     Substance and Sexual Activity   Sexual Activity Yes    Partners: Female, Male    Birth control/protection: Condom       She does not reports new sexual partner(s) in the last year. Preventive Medicine History:  Her most recent Pap smear result: normal was obtained in 2021  Her most recent HR HPV screen was not obtained in     She does not have a history of RISA 2, 3 or cervical cancer. Past Medical History:   Diagnosis Date    Acne     Encounter for breast cancer screening using non-mammogram modality     BI-RADS Assessment Category 2: Benign finding. Palpable lump in the    Mood disorder (HCC)     bipolar    Nexplanon insertion 2019    Routine Papanicolaou smear 2021    neg     OB History    Para Term  AB Living   0 0 0 0 0 0   SAB IAB Ectopic Molar Multiple Live Births   0 0 0   0       No past surgical history on file.   Family History   Problem Relation Age of Onset    Hypertension Maternal Grandfather     Cancer Maternal Grandfather         kidney cancer    Breast Cancer Maternal Grandmother     No Known Problems Mother     No Known Problems Father      Social History     Socioeconomic History    Marital status: SINGLE     Spouse name: Not on file    Number of children: Not on file    Years of education: Not on file    Highest education level: Not on file   Occupational History    Not on file   Tobacco Use    Smoking status: Never Smoker    Smokeless tobacco: Never Used   Vaping Use    Vaping Use: Every day    Substances: Nicotine   Substance and Sexual Activity    Alcohol use: Yes     Alcohol/week: 3.0 standard drinks     Types: 3 Glasses of wine per week    Drug use: Yes     Types: Marijuana     Comment: occasional    Sexual activity: Yes     Partners: Female, Male     Birth control/protection: Condom   Other Topics Concern    Not on file   Social History Narrative    Not on file     Social Determinants of Health     Financial Resource Strain:     Difficulty of Paying Living Expenses: Not on file   Food Insecurity:     Worried About Running Out of Food in the Last Year: Not on file    Stephan of Food in the Last Year: Not on file   Transportation Needs:     Lack of Transportation (Medical): Not on file    Lack of Transportation (Non-Medical):  Not on file   Physical Activity:     Days of Exercise per Week: Not on file    Minutes of Exercise per Session: Not on file   Stress:     Feeling of Stress : Not on file   Social Connections:     Frequency of Communication with Friends and Family: Not on file    Frequency of Social Gatherings with Friends and Family: Not on file    Attends Hinduism Services: Not on file    Active Member of Clubs or Organizations: Not on file    Attends Club or Organization Meetings: Not on file    Marital Status: Not on file   Intimate Partner Violence:     Fear of Current or Ex-Partner: Not on file    Emotionally Abused: Not on file    Physically Abused: Not on file    Sexually Abused: Not on file   Housing Stability:     Unable to Pay for Housing in the Last Year: Not on file    Number of Places Lived in the Last Year: Not on file    Unstable Housing in the Last Year: Not on file       No Known Allergies    Current Outpatient Medications   Medication Sig    Latuda 20 mg tab tablet Take 20 mg by mouth every evening.  sertraline (ZOLOFT) 50 mg tablet TAKE 1 TABLET BY MOUTH EVERYDAY AT BEDTIME    lamoTRIgine (LaMICtal) 200 mg tablet TAKE 1 TABLET BY MOUTH EVERYDAY AT BEDTIME    traZODone (DESYREL) 50 mg tablet TAKE 1 2 ORAL TABLETS AS NEEDED FOR SLEEP    conjugated estrogens (PREMARIN) 1.25 mg tablet 1po every day x 10-21 days prn abnormal bleeding (Patient not taking: Reported on 4/15/2022)    spironolactone (ALDACTONE) 100 mg tablet TAKE 1 TABLET BY MOUTH EVERY DAY AT NIGHT (Patient not taking: Reported on 7/11/2021)     No current facility-administered medications for this visit. There is no problem list on file for this patient.         Review of Systems - History obtained from the patient and patient filled out questionnaire   Constitutional/general, HEENT, CV, Resp, GI, MSK, Neuro, Psych, Heme/lymph, Skin, Breast ROS: no significant complaints except as noted on HPI    Physical Exam  Visit Vitals  /81   Pulse 80   Ht 5' 7\" (1.702 m)   Wt 154 lb 12.8 oz (70.2 kg)   BMI 24.25 kg/m²       Constitutional  · Appearance: well-nourished, well developed, alert, in no acute distress    HENT  · Head and Face: appears normal    Neck  · Inspection/Palpation: normal appearance, no masses or tenderness  · Lymph Nodes: no lymphadenopathy present  · Thyroid: gland size normal, nontender, no nodules or masses present on palpation    Chest  · Respiratory Effort: breathing unlabored  · Auscultation: normal breath sounds    Cardiovascular  · Heart:  · Auscultation: regular rate and rhythm without murmur    Breasts  · Inspection of Breasts: breasts symmetrical, no skin changes, no discharge present, nipple appearance normal, no skin retraction present  · Palpation of Breasts and Axillae: no masses present on palpation, no breast tenderness  · Axillary Lymph Nodes: no lymphadenopathy present    Gastrointestinal  · Abdominal Examination: abdomen non-tender to palpation, normal bowel sounds, no masses present  · Liver and spleen: no hepatomegaly present, spleen not palpable  · Hernias: no hernias identified    Genitourinary  · External Genitalia: normal appearance for age, no discharge present, no tenderness present, no inflammatory lesions present, no masses present  · Vagina: normal vaginal vault without central or paravaginal defects, white discharge present, no inflammatory lesions present, no masses present  · Bladder: non-tender to palpation  · Urethra: appears normal  · Cervix: normal   · Uterus: normal size, shape and consistency  · Adnexa: no adnexal tenderness present, no adnexal masses present  · Perineum: perineum within normal limits, no evidence of trauma, no rashes or skin lesions present  · Anus: anus within normal limits, no hemorrhoids present  · Inguinal Lymph Nodes: no lymphadenopathy present    Skin  · General Inspection: no rash, no lesions identified  · Healing bruises bilateral internal thighs    Neurologic/Psychiatric  · Mental Status:  · Orientation: grossly oriented to person, place and time  · Mood and Affect: mood normal, affect appropriate    Assessment:  24 y.o.  for well woman exam  Encounter Diagnoses   Name Primary?  Screening examination for venereal disease Yes    Encounter for well woman exam with routine gynecological exam     Worried well     Dysmenorrhea     Encounter for menstrual regulation        Plan:  The patient was counseled about diet, exercise, healthy lifestyle  We discussed current pap smear and HR HPV testing guidelines.    I recommended follow up one year for routine annual gynecologic exam or sooner prn  Handouts were given to the patient  I recommended follow up with a primary care physician for chronic medical problems and evaluation of non-gynecologic concerns and to please contact our office with any GYN questions or concerns. I recommended testing per CDC guidelines and at patient request.   Disc bruising, related to consensual sexual activity no concerns about abuse  STD screening   It is preferred to place LARC/IUD at the beginning of menstrual cycle or during the early part of the placebo week of current birth control, if patient is not breast feeding or does not currently have a LARC. To schedule a LARC insertion appointment, we recommend patient WWE is up to date, pap up to date/reviewed if >24years old, and that patient has not had unprotected intercourse x 2 weeks prior to visit, and shahana has checked with her insurance about coverage. Plan nexplanon  We discussed progesterone only and non hormonal options for contraception including but not limited to condoms, IUDs, Nexplanon, and depo provera. Disc pelvic pain should improve with hormonal management, notify MD if NI    Folllow up:  [x] return for annual well woman exam in one year or sooner if she is having problems  [] follow up and ultrasound  [] 6 months  [] 3 months  [] 6 weeks   [] 1 month    Orders Placed This Encounter    CT/NG/T.VAGINALIS AMPLIFICATION    HEP B SURFACE AG    T PALLIDUM SCREEN W/REFLEX    HIV SCREEN, [facility]. W/REFLEX CONFIRM    HEPATITIS C AB       No results found for any visits on 04/15/22.

## 2022-04-14 NOTE — PATIENT INSTRUCTIONS
Well Visit, Ages 25 to 48: Care Instructions  Overview     Well visits can help you stay healthy. Your doctor has checked your overall health and may have suggested ways to take good care of yourself. Your doctor also may have recommended tests. At home, you can help prevent illness with healthy eating, regular exercise, and other steps. Follow-up care is a key part of your treatment and safety. Be sure to make and go to all appointments, and call your doctor if you are having problems. It's also a good idea to know your test results and keep a list of the medicines you take. How can you care for yourself at home? · Get screening tests that you and your doctor decide on. Screening helps find diseases before any symptoms appear. · Eat healthy foods. Choose fruits, vegetables, whole grains, protein, and low-fat dairy foods. Limit fat, especially saturated fat. Reduce salt in your diet. · Limit alcohol. If you are a man, have no more than 2 drinks a day or 14 drinks a week. If you are a woman, have no more than 1 drink a day or 7 drinks a week. · Get at least 30 minutes of physical activity on most days of the week. Walking is a good choice. You also may want to do other activities, such as running, swimming, cycling, or playing tennis or team sports. Discuss any changes in your exercise program with your doctor. · Reach and stay at a healthy weight. This will lower your risk for many problems, such as obesity, diabetes, heart disease, and high blood pressure. · Do not smoke or allow others to smoke around you. If you need help quitting, talk to your doctor about stop-smoking programs and medicines. These can increase your chances of quitting for good. · Care for your mental health. It is easy to get weighed down by worry and stress. Learn strategies to manage stress, like deep breathing and mindfulness, and stay connected with your family and community.  If you find you often feel sad or hopeless, talk with your doctor. Treatment can help. · Talk to your doctor about whether you have any risk factors for sexually transmitted infections (STIs). You can help prevent STIs if you wait to have sex with a new partner (or partners) until you've each been tested for STIs. It also helps if you use condoms (male or female condoms) and if you limit your sex partners to one person who only has sex with you. Vaccines are available for some STIs, such as HPV. · Use birth control if it's important to you to prevent pregnancy. Talk with your doctor about the choices available and what might be best for you. · If you think you may have a problem with alcohol or drug use, talk to your doctor. This includes prescription medicines (such as amphetamines and opioids) and illegal drugs (such as cocaine and methamphetamine). Your doctor can help you figure out what type of treatment is best for you. · Protect your skin from too much sun. When you're outdoors from 10 a.m. to 4 p.m., stay in the shade or cover up with clothing and a hat with a wide brim. Wear sunglasses that block UV rays. Even when it's cloudy, put broad-spectrum sunscreen (SPF 30 or higher) on any exposed skin. · See a dentist one or two times a year for checkups and to have your teeth cleaned. · Wear a seat belt in the car. When should you call for help? Watch closely for changes in your health, and be sure to contact your doctor if you have any problems or symptoms that concern you. Where can you learn more? Go to http://www.[employer].com/  Enter P072 in the search box to learn more about \"Well Visit, Ages 25 to 48: Care Instructions. \"  Current as of: October 6, 2021               Content Version: 13.2  © 6203-5923 Healthwise, [employer]. Care instructions adapted under license by [employer] (which disclaims liability or warranty for this information).  If you have questions about a medical condition or this instruction, always ask your healthcare professional. [place] [address] any warranty or liability for your use of this information.

## 2022-04-15 ENCOUNTER — OFFICE VISIT (OUTPATIENT)
Dept: OBGYN CLINIC | Age: 22
End: 2022-04-15
Payer: COMMERCIAL

## 2022-04-15 VITALS
HEART RATE: 80 BPM | SYSTOLIC BLOOD PRESSURE: 119 MMHG | DIASTOLIC BLOOD PRESSURE: 81 MMHG | BODY MASS INDEX: 24.3 KG/M2 | WEIGHT: 154.8 LBS | HEIGHT: 67 IN

## 2022-04-15 DIAGNOSIS — Z11.3 SCREENING EXAMINATION FOR VENEREAL DISEASE: Primary | ICD-10-CM

## 2022-04-15 DIAGNOSIS — Z71.1 WORRIED WELL: ICD-10-CM

## 2022-04-15 DIAGNOSIS — N94.6 DYSMENORRHEA: ICD-10-CM

## 2022-04-15 DIAGNOSIS — Z76.89 ENCOUNTER FOR MENSTRUAL REGULATION: ICD-10-CM

## 2022-04-15 DIAGNOSIS — Z01.419 ENCOUNTER FOR WELL WOMAN EXAM WITH ROUTINE GYNECOLOGICAL EXAM: ICD-10-CM

## 2022-04-15 PROCEDURE — 99395 PREV VISIT EST AGE 18-39: CPT | Performed by: OBSTETRICS & GYNECOLOGY

## 2022-04-15 RX ORDER — LURASIDONE HYDROCHLORIDE 20 MG/1
20 TABLET, FILM COATED ORAL EVERY EVENING
COMMUNITY
Start: 2022-03-10

## 2022-04-18 LAB
C TRACH RRNA SPEC QL NAA+PROBE: NEGATIVE
N GONORRHOEA RRNA SPEC QL NAA+PROBE: NEGATIVE
T VAGINALIS RRNA SPEC QL NAA+PROBE: NEGATIVE

## 2022-04-19 LAB
HBV SURFACE AG SERPL QL IA: NEGATIVE
HCV AB S/CO SERPL IA: <0.1 S/CO RATIO (ref 0–0.9)
HIV 1+2 AB+HIV1 P24 AG SERPL QL IA: NON REACTIVE
TREPONEMA PALLIDUM IGG+IGM AB [PRESENCE] IN SERUM OR PLASMA BY IMMUNOASSAY: NON REACTIVE

## 2022-05-06 ENCOUNTER — OFFICE VISIT (OUTPATIENT)
Dept: OBGYN CLINIC | Age: 22
End: 2022-05-06
Payer: COMMERCIAL

## 2022-05-06 VITALS — SYSTOLIC BLOOD PRESSURE: 132 MMHG | DIASTOLIC BLOOD PRESSURE: 89 MMHG | HEART RATE: 83 BPM

## 2022-05-06 DIAGNOSIS — Z30.017 NEXPLANON INSERTION: Primary | ICD-10-CM

## 2022-05-06 DIAGNOSIS — N94.6 DYSMENORRHEA: ICD-10-CM

## 2022-05-06 DIAGNOSIS — Z76.89 ENCOUNTER FOR MENSTRUAL REGULATION: ICD-10-CM

## 2022-05-06 DIAGNOSIS — R30.0 DYSURIA: ICD-10-CM

## 2022-05-06 DIAGNOSIS — Z01.812 PRE-PROCEDURE LAB EXAM: ICD-10-CM

## 2022-05-06 DIAGNOSIS — R31.9 HEMATURIA, UNSPECIFIED TYPE: ICD-10-CM

## 2022-05-06 LAB
BILIRUB UR QL STRIP: NEGATIVE
GLUCOSE UR-MCNC: NEGATIVE MG/DL
HCG URINE, QL. (POC): NEGATIVE
KETONES P FAST UR STRIP-MCNC: NEGATIVE MG/DL
PH UR STRIP: 6.5 [PH] (ref 4.6–8)
PROT UR QL STRIP: NEGATIVE
SP GR UR STRIP: 1.03 (ref 1–1.03)
UA UROBILINOGEN AMB POC: NORMAL (ref 0.2–1)
URINALYSIS CLARITY POC: CLEAR
URINALYSIS COLOR POC: YELLOW
URINE BLOOD POC: NORMAL
URINE LEUKOCYTES POC: NEGATIVE
URINE NITRITES POC: NEGATIVE
VALID INTERNAL CONTROL?: YES

## 2022-05-06 PROCEDURE — 81025 URINE PREGNANCY TEST: CPT | Performed by: OBSTETRICS & GYNECOLOGY

## 2022-05-06 PROCEDURE — 99212 OFFICE O/P EST SF 10 MIN: CPT | Performed by: OBSTETRICS & GYNECOLOGY

## 2022-05-06 PROCEDURE — 81002 URINALYSIS NONAUTO W/O SCOPE: CPT | Performed by: OBSTETRICS & GYNECOLOGY

## 2022-05-06 PROCEDURE — 11981 INSERTION DRUG DLVR IMPLANT: CPT | Performed by: OBSTETRICS & GYNECOLOGY

## 2022-05-06 NOTE — PROGRESS NOTES
[facility]  [email or web address]  [phone]    Martha Good MD, [facility]       OB/GYN Problem visit    Chief Complaint: No chief complaint on file. CC: dysuria, procedure    Last or next WWE is: 4/15/22    History of Present Illness: This is a new problem being evaluated by this provider. For the past 3 months she gets a UTI after intercourse. She goes to the school clinic & uses a 3 day rx & sx get better same day. Reports buring & frewuent urination. Denies itching & abnormal dicharge. Pt reports her mother has been on an rx to take after intercourse, pt interested in that. The patient is a 24 y.o. [de-identified] P5 female   She reports the symptoms are is unchanged. Aggravating factors include none. Alleviating factors include rx from clinic. She does not have other concerns. LMP: Patient's last menstrual period was 05/04/2022. PFSH:  Past Medical History:   Diagnosis Date    Acne     Encounter for breast cancer screening using non-mammogram modality     BI-RADS Assessment Category 2: Benign finding. Palpable lump in the    Mood disorder (HCC)     bipolar    Nexplanon insertion 06/04/2019    Routine Papanicolaou smear 03/03/2021    neg     History reviewed. No pertinent surgical history. Family History   Problem Relation Age of Onset    Hypertension Maternal Grandfather     Cancer Maternal Grandfather         kidney cancer    Breast Cancer Maternal Grandmother     No Known Problems Mother     No Known Problems Father      Social History     Tobacco Use    Smoking status: Never Smoker    Smokeless tobacco: Never Used   Vaping Use    Vaping Use: Every day    Substances: Nicotine   Substance Use Topics    Alcohol use:  Yes     Alcohol/week: 3.0 standard drinks     Types: 3 Glasses of wine per week    Drug use: Yes     Types: Marijuana     Comment: occasional     No Known Allergies  Current Outpatient Medications   Medication Sig    Latuda 20 mg tab tablet Take 20 mg by mouth every evening.  sertraline (ZOLOFT) 50 mg tablet TAKE 1 TABLET BY MOUTH EVERYDAY AT BEDTIME    lamoTRIgine (LaMICtal) 200 mg tablet TAKE 1 TABLET BY MOUTH EVERYDAY AT BEDTIME    traZODone (DESYREL) 50 mg tablet TAKE 1 2 ORAL TABLETS AS NEEDED FOR SLEEP    conjugated estrogens (PREMARIN) 1.25 mg tablet 1po every day x 10-21 days prn abnormal bleeding (Patient not taking: Reported on 4/15/2022)    spironolactone (ALDACTONE) 100 mg tablet TAKE 1 TABLET BY MOUTH EVERY DAY AT NIGHT (Patient not taking: Reported on 7/11/2021)     No current facility-administered medications for this visit. Review of Systems:  History obtained from the patient  Constitutional: negative for fevers, chills and weight loss  ENT ROS: negative for - hearing change, oral lesions or visual changes  Respiratory: negative for cough, wheezing or dyspnea on exertion  Cardiovascular: negative for chest pain, irregular heart beats, exertional chest pressure/discomfort  Gastrointestinal: negative for dysphagia, nausea and vomiting  Genito-Urinary ROS:  see HPI  Inteument/breast: negative for rash, breast lump and nipple discharge  Musculoskeletal:negative for stiff joints, neck pain and muscle weakness  Endocrine ROS: negative for - breast changes, galactorrhea or temperature intolerance  Hematological and Lymphatic ROS: negative for - blood clots, bruising or swollen lymph nodes    Physical Exam:  Visit Vitals  /89   Pulse 83       GENERAL: alert, well appearing, and in no distress  HEAD: normocephalic, atraumatic. ABDOMEN: soft, nontender, nondistended, no masses or organomegaly   BACK no cvat  NEURO: alert, oriented, normal speech    Assessment:  Encounter Diagnoses   Name Primary?     Nexplanon insertion Yes    Dysuria     Hematuria, unspecified type     Dysmenorrhea        Plan:  The patient is advised that she should contact the office if she does not note improvement or if symptoms recur  Recommend follow up with PCP for non-gynecologic complaints and chronic medical problems. She should contact our office with any questions or concerns  She could keep her routine annual exam appointment. UTI precautions, pyelo precautions given  Disc potential causes and etiology of UTI  Notify MD if NI in symptoms or worsening symptoms: pain/fever  Recommend increased water intake, regular voiding, voiding after coitus, avoid bladder irritants  Pt notified we will contact them with culture results and alter treatment if needed  If symptoms persist after treatment or recur: rec follow up evaulation  Will hold on abx and send ur cx    No orders of the defined types were placed in this encounter. No results found for this visit on 05/06/22.

## 2022-05-06 NOTE — PROGRESS NOTES
[address]  [email or web address]  [phone]    Martha Good MD, [facility]  OFFICE PROCEDURE PROGRESS NOTE    Chart reviewed for the following:   Jacinda HUTCHISON, have reviewed the History, Physical and updated the Allergic reactions for [place] performed immediately prior to start of procedure:   Jacinda HUTCHISON, have performed the following reviews on [employer] prior to the start of the procedure:            * Patient was identified by name and date of birth   * Agreement on procedure being performed was verified  * Risks and Benefits explained to the patient  * Procedure site verified and marked as necessary  * Patient was positioned for comfort  * Consent was signed and verified     Time: 8:57 AM      Date of procedure: 5/6/2022    Procedure performed by: Martha Good MD    Provider assisted by:   Jacinda Peace MA    Patient assisted by: self    How tolerated by patient: tolerated the procedure well with no complications    Post Procedural Pain Scale: 2 - Hurts Little Bit    Comments: none      Procedure note: Nexplanon insertion    [employer] is a [de-identified] ,  24 y.o. female [facility] whose Patient's last menstrual period was 05/04/2022. was on 5/4/2022 presents for office insertion of an [address] [place] [address] sub-dermal contraceptive implant. She has had an opportunity to read the [address] [place] [address] \"Patient Labeling and Consent Form\". We counseled the patient about the insertion and removal procedures as well as potential side-effects, benefits and risks. She state she had no further questions and signed the consent form. She has been using condoms to the present time. She confirmed that she has no allergies to Betadine or Xylocaine. She reclined on the examination table in the supine position with her left arm flexed at the elbow and externally rotated.  The insertion site was identified and marked approximately 6-8 cm proximal to the elbow crease at the inner side of the upper arm in the standard fashion. A second marsha was placed 6-8 cm above the first marsha. The insertion site was cleansed with Betadine antiseptic. Approximately 3 ml of 1% plain xylocaine were injected just under the skin along the planned insertion canal. The NEXPLANON sterile applicator was carefully removed from its blister pack and kept sterile. I removed the needle cap. I visually verified the presence of NEXPLANON inside the needle tip. The skin at the insertion site was then stretched by my thumb and index finger. I then inserted the needle tip through the skin at the appropriate angle to the skin surface, just until the skin has been penetrated. The needle was gently inserted to its full length. The slider was then pushed all the way and then released. I then removed the needle and palpated the implant in the appropriate location. The patient also palpated the implant in place. Both the patient and I were able to confirm the presence of the Veatrice Orlando in its subdermal location by palpation. A small adhesive bandage over the insertion site then wrapped a pressure bandage with sterile gauze. The patient User Card and Patient Chart Label were filled in. She was given the User Card for her records after explaining it to her in detail. I stressed to her that she must have the Veatrice Orlando removed before three years from today's date. She was then given the Personal Calendar (Bleeding Diary) with instructions in it's use. The patient received Nexplanon lot number (see MA note). The Nexplanon did not come from a speciality pharmacy. We discussed typical bleeding patterns and side effects with the Nexplanon  We recommend bleeding/symptom calendar and follow three months to evaluate. We recommended back up contraception x 2 weeks after insertion.

## 2022-05-13 LAB — BACTERIA UR CULT: ABNORMAL

## 2022-05-14 NOTE — PROGRESS NOTES
Abnormal results. C/w UTI. Abx sent if preferred pharmacy on file: bactrim  [address] message sent if active. Notify pt ASAP if [address] message not read or MC not active.   Rec fu if NI.

## 2022-05-17 NOTE — PROGRESS NOTES
Written by Chris Small MD on 5/14/2022  1:14 PM EDT View Full Comments  Seen by patient Merry Chacon on 5/14/2022  2:59 PM

## 2022-05-30 ENCOUNTER — APPOINTMENT (OUTPATIENT)
Dept: GENERAL RADIOLOGY | Age: 22
End: 2022-05-30
Attending: EMERGENCY MEDICINE
Payer: COMMERCIAL

## 2022-05-30 ENCOUNTER — HOSPITAL ENCOUNTER (EMERGENCY)
Age: 22
Discharge: HOME | End: 2022-05-30
Attending: EMERGENCY MEDICINE
Payer: COMMERCIAL

## 2022-05-30 VITALS
SYSTOLIC BLOOD PRESSURE: 121 MMHG | HEART RATE: 98 BPM | OXYGEN SATURATION: 100 % | DIASTOLIC BLOOD PRESSURE: 81 MMHG | RESPIRATION RATE: 18 BRPM | TEMPERATURE: 97.6 F

## 2022-05-30 DIAGNOSIS — T07.XXXA MULTIPLE ABRASIONS: ICD-10-CM

## 2022-05-30 DIAGNOSIS — S62.101A RIGHT WRIST FRACTURE, CLOSED, INITIAL ENCOUNTER: ICD-10-CM
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
## 2022-05-30 DIAGNOSIS — S60.222A CONTUSION OF LEFT HAND, INITIAL ENCOUNTER: ICD-10-CM

## 2022-05-30 PROCEDURE — 73130 X-RAY EXAM OF HAND: CPT

## 2022-05-30 PROCEDURE — 73110 X-RAY EXAM OF WRIST: CPT

## 2022-05-30 PROCEDURE — 99283 EMERGENCY DEPT VISIT LOW MDM: CPT

## 2022-05-30 PROCEDURE — 74011250637 HC RX REV CODE- 250/637: Performed by: EMERGENCY MEDICINE

## 2022-05-30 PROCEDURE — 75810000053 HC SPLINT APPLICATION

## 2022-05-30 RX ORDER — HYDROCODONE BITARTRATE AND ACETAMINOPHEN 7.5; 325 MG/1; MG/1
1 TABLET ORAL
Qty: 9 TABLET | Refills: 0 | Status: SHIPPED | OUTPATIENT
Start: 2022-05-30 | End: 2022-06-02

## 2022-05-30 RX ORDER — HYDROCODONE BITARTRATE AND ACETAMINOPHEN 7.5; 325 MG/1; MG/1
1 TABLET ORAL
Status: COMPLETED | OUTPATIENT
Start: 2022-05-30 | End: 2022-05-30

## 2022-05-30 RX ORDER — IBUPROFEN 600 MG/1
600 TABLET ORAL
Status: COMPLETED | OUTPATIENT
Start: 2022-05-30 | End: 2022-05-30

## 2022-05-30 RX ORDER — ONDANSETRON 4 MG/1
4 TABLET, ORALLY DISINTEGRATING ORAL
Status: COMPLETED | OUTPATIENT
Start: 2022-05-30 | End: 2022-05-30

## 2022-05-30 RX ADMIN — ONDANSETRON 4 MG: 4 TABLET, ORALLY DISINTEGRATING ORAL at 16:56

## 2022-05-30 RX ADMIN — IBUPROFEN 600 MG: 600 TABLET, FILM COATED ORAL at 16:56

## 2022-05-30 RX ADMIN — HYDROCODONE BITARTRATE AND ACETAMINOPHEN 1 TABLET: 7.5; 325 TABLET ORAL at 16:56

## 2022-05-30 NOTE — Clinical Note
Kishore George was seen and treated in our emergency department on 5/30/2022.     Kishore George cannot return to work until cleared by an orthopedic specialist.    Jeimy Solorzano MD
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
## 2022-05-30 NOTE — Clinical Note
Maryanne Albarran was seen and treated in our emergency department on 5/30/2022.     Maryanne Albarran cannot return to work until cleared by an orthopedic specialist.    Arya Bynre MD

## 2023-04-04 ENCOUNTER — OFFICE VISIT (OUTPATIENT)
Dept: OBGYN CLINIC | Age: 23
End: 2023-04-04
Payer: COMMERCIAL

## 2023-04-04 PROCEDURE — 11982 REMOVE DRUG IMPLANT DEVICE: CPT | Performed by: OBSTETRICS & GYNECOLOGY

## 2023-04-04 NOTE — PROGRESS NOTES
Procedure note: Nexplanon/Implanon removal    Myranda West is a [de-identified] ,  20 y.o. female whose Patient's last menstrual period was 03/10/2023 (approximate). .  She presents for office removal of a NEXPLANON/IMPLANON sub-dermal contraceptive implant. Procedure:  She was positioned so the site of her implant was visable and easily accessible. The implant was located by palpation. The end of the implant nearest the elbow was marked with a sterile marker. The operative site was cleansed with Betadine. Using a 27 gauge needle on a 3cc syringe and 1% lidocaine, 2 cc were infiltrated as a intradermal wheal and underneath the end of the implant closest to the elbow. Downward pressure was applied on the end of the implant nearest the axilla and a 2-3mm incision was made in the longitudinal direction of the arm at the tip of the implant closest to the elbow. The implant was then pushed gently toward the incision until the tip was visible. The fibrous capsule was opened with a combination of blunt and sharp dissection. The implant was grasped with mosquito forceps and removed intact. It measured a full 4 cm in length. The skin was cleansed and dried. A steristrip was applied then topped with a folded 4x4 gauze and a Curlex pressure dressing. There were no complication or problems. She demonstrated full active range of movement of her elbow, wrist, all five digits and denied numbness and tingling. Post-procedure:  She was told to remove the dressing in 12-24 hours, to keep the incision area dry for 24 hours and to remove the Steristrip in 5-7 days. She was given our 24-hour phone number and encouraged to call if there are any problems.   She will use condoms for contraception

## 2023-04-04 NOTE — PROGRESS NOTES
Sierra Gutierrez is a 20 y.o. female presents for a problem visit. Chief Complaint   Patient presents with    Nexplanon       Problems:  Nexplanon Removal      Patient's last menstrual period was 03/10/2023 (approximate). Birth Control: Implant. Last Pap: normal obtained 2 year(s) ago. The patient had Nexplanon inserted 10 months ago. She states she has been dealing with depression. Since having it inserted she states its has made her sad and gain 80lbs. She would like to use condoms for contraception, for now. She mentioned she's interested in tubal ligation as she never wants to have kids. 1. Have you been to the ER, urgent care clinic, or hospitalized since your last visit? yes    2. Have you seen or consulted any other health care providers outside of the [address] [place] [address] since your last visit?  No        Examination chaperoned by Marva Hodgson MA.

## 2023-05-04 ENCOUNTER — HOSPITAL ENCOUNTER (EMERGENCY)
Age: 23
Discharge: HOME | End: 2023-05-05
Attending: EMERGENCY MEDICINE
Payer: COMMERCIAL

## 2023-05-04 ENCOUNTER — APPOINTMENT (OUTPATIENT)
Dept: CT IMAGING | Age: 23
End: 2023-05-04
Attending: NURSE PRACTITIONER
Payer: COMMERCIAL

## 2023-05-04 VITALS
SYSTOLIC BLOOD PRESSURE: 142 MMHG | OXYGEN SATURATION: 100 % | RESPIRATION RATE: 16 BRPM | TEMPERATURE: 98.2 F | HEART RATE: 90 BPM | WEIGHT: 189.82 LBS | BODY MASS INDEX: 31.63 KG/M2 | DIASTOLIC BLOOD PRESSURE: 94 MMHG | HEIGHT: 65 IN

## 2023-05-04 DIAGNOSIS — H60.501 ACUTE OTITIS EXTERNA OF RIGHT EAR, UNSPECIFIED TYPE: Primary | ICD-10-CM

## 2023-05-04 LAB
ALBUMIN SERPL-MCNC: 4.3 G/DL (ref 3.5–5)
ALBUMIN/GLOB SERPL: 1.3 (ref 1.1–2.2)
ALP SERPL-CCNC: 68 U/L (ref 45–117)
ALT SERPL-CCNC: 20 U/L (ref 12–78)
ANION GAP SERPL CALC-SCNC: 3 MMOL/L (ref 5–15)
AST SERPL-CCNC: 14 U/L (ref 15–37)
BILIRUB SERPL-MCNC: 0.4 MG/DL (ref 0.2–1)
BUN SERPL-MCNC: 11 MG/DL (ref 6–20)
BUN/CREAT SERPL: 13 (ref 12–20)
CALCIUM SERPL-MCNC: 8.9 MG/DL (ref 8.5–10.1)
CHLORIDE SERPL-SCNC: 106 MMOL/L (ref 97–108)
CO2 SERPL-SCNC: 28 MMOL/L (ref 21–32)
CREAT SERPL-MCNC: 0.83 MG/DL (ref 0.55–1.02)
GLOBULIN SER CALC-MCNC: 3.3 G/DL (ref 2–4)
GLUCOSE SERPL-MCNC: 103 MG/DL (ref 65–100)
POTASSIUM SERPL-SCNC: 4.1 MMOL/L (ref 3.5–5.1)
PROT SERPL-MCNC: 7.6 G/DL (ref 6.4–8.2)
SODIUM SERPL-SCNC: 137 MMOL/L (ref 136–145)

## 2023-05-04 PROCEDURE — 85025 COMPLETE CBC W/AUTO DIFF WBC: CPT

## 2023-05-04 PROCEDURE — 36415 COLL VENOUS BLD VENIPUNCTURE: CPT

## 2023-05-04 PROCEDURE — 99285 EMERGENCY DEPT VISIT HI MDM: CPT

## 2023-05-04 PROCEDURE — 74011000636 HC RX REV CODE- 636: Performed by: RADIOLOGY

## 2023-05-04 PROCEDURE — 80053 COMPREHEN METABOLIC PANEL: CPT

## 2023-05-04 PROCEDURE — 70491 CT SOFT TISSUE NECK W/DYE: CPT

## 2023-05-04 RX ORDER — OXYCODONE AND ACETAMINOPHEN 5; 325 MG/1; MG/1
1 TABLET ORAL ONCE
Status: COMPLETED | OUTPATIENT
Start: 2023-05-04 | End: 2023-05-05

## 2023-05-04 RX ADMIN — IOPAMIDOL 100 ML: 612 INJECTION, SOLUTION INTRAVENOUS at 23:25

## 2023-05-05 LAB
BASOPHILS # BLD: 0 K/UL (ref 0–0.1)
BASOPHILS NFR BLD: 0 % (ref 0–1)
DIFFERENTIAL METHOD BLD: ABNORMAL
EOSINOPHIL # BLD: 0.1 K/UL (ref 0–0.4)
EOSINOPHIL NFR BLD: 1 % (ref 0–7)
ERYTHROCYTE [DISTWIDTH] IN BLOOD BY AUTOMATED COUNT: 13 % (ref 11.5–14.5)
HCT VFR BLD AUTO: 42.3 % (ref 35–47)
HGB BLD-MCNC: 13.7 G/DL (ref 11.5–16)
IMM GRANULOCYTES # BLD AUTO: 0 K/UL (ref 0–0.04)
IMM GRANULOCYTES NFR BLD AUTO: 0 % (ref 0–0.5)
LYMPHOCYTES # BLD: 0.8 K/UL (ref 0.8–3.5)
LYMPHOCYTES NFR BLD: 6 % (ref 12–49)
MCH RBC QN AUTO: 28.5 PG (ref 26–34)
MCHC RBC AUTO-ENTMCNC: 32.4 G/DL (ref 30–36.5)
MCV RBC AUTO: 88.1 FL (ref 80–99)
MONOCYTES # BLD: 1.1 K/UL (ref 0–1)
MONOCYTES NFR BLD: 8 % (ref 5–13)
NEUTS SEG # BLD: 12.1 K/UL (ref 1.8–8)
NEUTS SEG NFR BLD: 85 % (ref 32–75)
NRBC # BLD: 0 K/UL (ref 0–0.01)
NRBC BLD-RTO: 0 PER 100 WBC
PLATELET # BLD AUTO: 218 K/UL (ref 150–400)
PMV BLD AUTO: 10.3 FL (ref 8.9–12.9)
RBC # BLD AUTO: 4.8 M/UL (ref 3.8–5.2)
RBC MORPH BLD: ABNORMAL
WBC # BLD AUTO: 14.1 K/UL (ref 3.6–11)

## 2023-05-05 PROCEDURE — 74011250637 HC RX REV CODE- 250/637: Performed by: NURSE PRACTITIONER

## 2023-05-05 RX ORDER — OFLOXACIN 3 MG/ML
5 SOLUTION AURICULAR (OTIC) 2 TIMES DAILY
Qty: 5 ML | Refills: 0 | Status: SHIPPED | OUTPATIENT
Start: 2023-05-05 | End: 2023-05-12

## 2023-05-05 RX ORDER — KETOROLAC TROMETHAMINE 10 MG/1
10 TABLET, FILM COATED ORAL
Qty: 20 TABLET | Refills: 0 | Status: SHIPPED | OUTPATIENT
Start: 2023-05-05

## 2023-05-05 RX ORDER — TRAMADOL HYDROCHLORIDE 50 MG/1
50 TABLET ORAL
Qty: 12 TABLET | Refills: 0 | Status: SHIPPED | OUTPATIENT
Start: 2023-05-05 | End: 2023-05-08

## 2023-05-05 RX ORDER — OFLOXACIN 3 MG/ML
5 SOLUTION AURICULAR (OTIC)
Status: DISCONTINUED | OUTPATIENT
Start: 2023-05-05 | End: 2023-05-05 | Stop reason: HOSPADM

## 2023-05-05 RX ADMIN — OXYCODONE HYDROCHLORIDE AND ACETAMINOPHEN 1 TABLET: 5; 325 TABLET ORAL at 00:11

## 2023-05-23 RX ORDER — TRAZODONE HYDROCHLORIDE 50 MG/1
TABLET ORAL
COMMUNITY
Start: 2020-11-19

## 2023-05-23 RX ORDER — SPIRONOLACTONE 100 MG/1
1 TABLET, FILM COATED ORAL NIGHTLY
COMMUNITY
Start: 2020-12-24

## 2023-05-23 RX ORDER — LAMOTRIGINE 200 MG/1
TABLET ORAL
COMMUNITY
Start: 2021-01-04

## 2023-05-23 RX ORDER — LURASIDONE HYDROCHLORIDE 20 MG/1
20 TABLET, FILM COATED ORAL EVERY EVENING
COMMUNITY
Start: 2022-03-10

## 2023-05-23 RX ORDER — KETOROLAC TROMETHAMINE 10 MG/1
10 TABLET, FILM COATED ORAL EVERY 6 HOURS PRN
COMMUNITY
Start: 2023-05-05